# Patient Record
Sex: MALE | Race: WHITE | NOT HISPANIC OR LATINO | Employment: OTHER | URBAN - METROPOLITAN AREA
[De-identification: names, ages, dates, MRNs, and addresses within clinical notes are randomized per-mention and may not be internally consistent; named-entity substitution may affect disease eponyms.]

---

## 2018-02-15 ENCOUNTER — OFFICE VISIT (OUTPATIENT)
Dept: SURGERY | Facility: CLINIC | Age: 48
End: 2018-02-15
Payer: OTHER GOVERNMENT

## 2018-02-15 VITALS
WEIGHT: 199.75 LBS | HEART RATE: 79 BPM | SYSTOLIC BLOOD PRESSURE: 123 MMHG | DIASTOLIC BLOOD PRESSURE: 76 MMHG | HEIGHT: 68 IN | BODY MASS INDEX: 30.27 KG/M2

## 2018-02-15 DIAGNOSIS — Z85.048 HISTORY OF RECTAL OR ANAL CANCER: ICD-10-CM

## 2018-02-15 PROCEDURE — 99999 PR PBB SHADOW E&M-NEW PATIENT-LVL II: CPT | Mod: PBBFAC,,, | Performed by: COLON & RECTAL SURGERY

## 2018-02-15 PROCEDURE — 99202 OFFICE O/P NEW SF 15 MIN: CPT | Mod: PBBFAC,PO | Performed by: COLON & RECTAL SURGERY

## 2018-02-15 PROCEDURE — 99203 OFFICE O/P NEW LOW 30 MIN: CPT | Mod: S$PBB,,, | Performed by: COLON & RECTAL SURGERY

## 2018-02-15 NOTE — PROGRESS NOTES
Patient ID:  Bud Roberts is a 47 y.o. male     Chief Complaint:   Chief Complaint   Patient presents with    Follow-up        HPI:  7/30/12 LAR and coloanal pulthrough for T3N0 adenoa    s/p ileostomy closure on 9/10/12. C-scope Sept 2016 neg scans    Has urgency and accidents.    ROS:        Constitutional: No fever, chills, activity or appetite change.      HENT: No hearing loss, facial swelling, neck pain or stiffness.       Eyes: No discharge, itching and visual disturbance.      Respiratory: No apnea, cough, choking or shortness of breath.       Cardiovascular: No leg swelling or chest pain      Gastrointestinal: No abdominal distention or change in bowel habbits     Genitourinary: No dysuria, frequency or flank pain.      Musculoskeletal: No arthralgias or gait problem.      Neurological: No dizziness, seizures or weakness.      Hematological: No adenopathy.      Psychiatric/Behavioral: No hallucinations or behavioral problems.       PE:    APPEARANCE: Well nourished, well developed, in no acute distress.   CHEST: Lungs clear. Normal respiratory effort.  CARDIOVASCULAR: Normal rhythm. No edema.  ABDOMEN: Soft. No tenderness or masses.  Rectum:  Normal skin, NST, no masses or tenderness    Musculoskeletal: Symmetric, normal range of motion and strength.   Neurological: Alert and oriented to person, place, and time. Normal reflexes.   Skin: Skin is warm and dry.   Psychiatric: Normal mood and affect. Behavior is normal and appropriate.     Impression: s/p LAR     PLAN: Adjust Meds and fiber  RTC 1 months

## 2018-02-15 NOTE — PATIENT INSTRUCTIONS
Fiber on a daily basis 2 capsules in morning (Metamuci, citrucil or Gummy Fiber) by mouth in AM    Miralax  1/2 capful in AM    Imodium 2 mg daily in morning after 1st BM and 1 at night    Make 1 change  After 3-4 days.

## 2018-03-15 ENCOUNTER — OFFICE VISIT (OUTPATIENT)
Dept: SURGERY | Facility: CLINIC | Age: 48
End: 2018-03-15
Payer: OTHER GOVERNMENT

## 2018-03-15 VITALS
HEART RATE: 84 BPM | DIASTOLIC BLOOD PRESSURE: 72 MMHG | HEIGHT: 68 IN | BODY MASS INDEX: 30.78 KG/M2 | WEIGHT: 203.06 LBS | SYSTOLIC BLOOD PRESSURE: 121 MMHG | RESPIRATION RATE: 16 BRPM | TEMPERATURE: 98 F

## 2018-03-15 DIAGNOSIS — Z85.048 HISTORY OF RECTAL OR ANAL CANCER: Primary | ICD-10-CM

## 2018-03-15 DIAGNOSIS — R15.9 FULL INCONTINENCE OF FECES: ICD-10-CM

## 2018-03-15 PROCEDURE — 99213 OFFICE O/P EST LOW 20 MIN: CPT | Mod: PBBFAC,PO | Performed by: COLON & RECTAL SURGERY

## 2018-03-15 PROCEDURE — 99214 OFFICE O/P EST MOD 30 MIN: CPT | Mod: S$PBB,,, | Performed by: COLON & RECTAL SURGERY

## 2018-03-15 PROCEDURE — 99999 PR PBB SHADOW E&M-EST. PATIENT-LVL III: CPT | Mod: PBBFAC,,, | Performed by: COLON & RECTAL SURGERY

## 2018-03-15 RX ORDER — LISINOPRIL 10 MG/1
10 TABLET ORAL DAILY
COMMUNITY

## 2018-03-15 RX ORDER — HYDROCODONE BITARTRATE AND ACETAMINOPHEN 10; 325 MG/1; MG/1
41 TABLET ORAL EVERY 6 HOURS PRN
Status: ON HOLD | COMMUNITY
End: 2018-04-20 | Stop reason: HOSPADM

## 2018-03-15 NOTE — PROGRESS NOTES
Patient ID:  Bud Roberts Jr. is a 47 y.o. male     Chief Complaint:   Chief Complaint   Patient presents with    Follow-up     incontinence        HPI: No improvement with Imodium    7/30/12 LAR and coloanal pulthrough for T3N0 adenoa    s/p ileostomy closure on 9/10/12. C-scope Sept 2016 neg scans    Has urgency and accidents.    ROS:        Constitutional: No fever, chills, activity or appetite change.      HENT: No hearing loss, facial swelling, neck pain or stiffness.       Eyes: No discharge, itching and visual disturbance.      Respiratory: No apnea, cough, choking or shortness of breath.       Cardiovascular: No leg swelling or chest pain      Gastrointestinal: No abdominal distention or change in bowel habbits     Genitourinary: No dysuria, frequency or flank pain.      Musculoskeletal: No arthralgias or gait problem.      Neurological: No dizziness, seizures or weakness.      Hematological: No adenopathy.      Psychiatric/Behavioral: No hallucinations or behavioral problems.       PE:    APPEARANCE: Well nourished, well developed, in no acute distress.   CHEST: Lungs clear. Normal respiratory effort.  CARDIOVASCULAR: Normal rhythm. No edema.  ABDOMEN: Soft. No tenderness or masses.  Rectum:  Normal skin, NST, no masses or tenderness    Musculoskeletal: Symmetric, normal range of motion and strength.   Neurological: Alert and oriented to person, place, and time. Normal reflexes.   Skin: Skin is warm and dry.   Psychiatric: Normal mood and affect. Behavior is normal and appropriate.     Impression: s/p LAR, LAR syndrome   PLAN: DIscussed optionsd and how to adjust meds. Patient will call to schedule colostomy.

## 2018-03-15 NOTE — PROGRESS NOTES
Patient ID:  Bud Roberts Jr. is a 47 y.o. male     Chief Complaint:   Chief Complaint   Patient presents with    Follow-up     incontinence        HPI:  7/30/12 LAR and coloanal pulthrough for T3N0 adenoa    s/p ileostomy closure on 9/10/12. C-scope Sept 2016 neg scans    Has urgency and accidents.    ROS:        Constitutional: No fever, chills, activity or appetite change.      HENT: No hearing loss, facial swelling, neck pain or stiffness.       Eyes: No discharge, itching and visual disturbance.      Respiratory: No apnea, cough, choking or shortness of breath.       Cardiovascular: No leg swelling or chest pain      Gastrointestinal: No abdominal distention or change in bowel habbits     Genitourinary: No dysuria, frequency or flank pain.      Musculoskeletal: No arthralgias or gait problem.      Neurological: No dizziness, seizures or weakness.      Hematological: No adenopathy.      Psychiatric/Behavioral: No hallucinations or behavioral problems.       PE:    APPEARANCE: Well nourished, well developed, in no acute distress.   CHEST: Lungs clear. Normal respiratory effort.  CARDIOVASCULAR: Normal rhythm. No edema.  ABDOMEN: Soft. No tenderness or masses.  Rectum:  Normal skin, NST, no masses or tenderness    Musculoskeletal: Symmetric, normal range of motion and strength.   Neurological: Alert and oriented to person, place, and time. Normal reflexes.   Skin: Skin is warm and dry.   Psychiatric: Normal mood and affect. Behavior is normal and appropriate.     Impression: s/p LAR     PLAN: Adjust Meds and fiber  RTC 1 months

## 2018-03-16 ENCOUNTER — TELEPHONE (OUTPATIENT)
Dept: SURGERY | Facility: CLINIC | Age: 48
End: 2018-03-16

## 2018-03-16 NOTE — TELEPHONE ENCOUNTER
----- Message from Drea Major MA sent at 3/16/2018 10:46 AM CDT -----  Contact: self  [phone]  States he is calling to scheduled his surgery.

## 2018-03-16 NOTE — TELEPHONE ENCOUNTER
Pt wanted to schedule surgery for colostomy creation. Pt wanted to schedule for 4/16/18. Told him he will get a call back Monday to let him know if he needs to come in for paperwork.

## 2018-03-29 ENCOUNTER — TELEPHONE (OUTPATIENT)
Dept: SURGERY | Facility: CLINIC | Age: 48
End: 2018-03-29

## 2018-03-29 DIAGNOSIS — Z85.048 HISTORY OF RECTAL OR ANAL CANCER: ICD-10-CM

## 2018-03-29 DIAGNOSIS — R15.9 INCONTINENCE OF FECES, UNSPECIFIED FECAL INCONTINENCE TYPE: Primary | ICD-10-CM

## 2018-03-29 DIAGNOSIS — R15.9 INCONTINENCE OF FECES: ICD-10-CM

## 2018-03-29 RX ORDER — METRONIDAZOLE 500 MG/1
TABLET ORAL
Qty: 3 TABLET | Refills: 0 | Status: ON HOLD | OUTPATIENT
Start: 2018-03-29 | End: 2018-04-20 | Stop reason: HOSPADM

## 2018-03-29 RX ORDER — MUPIROCIN 20 MG/G
OINTMENT TOPICAL
Status: CANCELLED | OUTPATIENT
Start: 2018-03-29

## 2018-03-29 RX ORDER — SODIUM CHLORIDE 9 MG/ML
INJECTION, SOLUTION INTRAVENOUS CONTINUOUS
Status: CANCELLED | OUTPATIENT
Start: 2018-03-29

## 2018-03-29 RX ORDER — NEOMYCIN SULFATE 500 MG/1
1000 TABLET ORAL ONCE
Qty: 6 TABLET | Refills: 0 | Status: SHIPPED | OUTPATIENT
Start: 2018-03-29 | End: 2018-03-29

## 2018-03-29 RX ORDER — ACETAMINOPHEN 10 MG/ML
1000 INJECTION, SOLUTION INTRAVENOUS
Status: CANCELLED | OUTPATIENT
Start: 2018-03-29 | End: 2018-03-29

## 2018-03-29 RX ORDER — METRONIDAZOLE 500 MG/100ML
500 INJECTION, SOLUTION INTRAVENOUS
Status: CANCELLED | OUTPATIENT
Start: 2018-03-29

## 2018-03-29 NOTE — TELEPHONE ENCOUNTER
----- Message from Laquita Zamorano MA sent at 3/29/2018 10:42 AM CDT -----  Contact: [phone]  Pt states that he is still needing to speak to someone about getting set up for surgery on April 16th, he said that it's been a week or so since he spoke to anyone.    [phone]

## 2018-03-29 NOTE — TELEPHONE ENCOUNTER
Spoke with patient regarding his surgery on 4/16 for a colostomy.  Dr. Greenberg will consent him the morning of surgery.  I went over his instructions and emailed them to him. He understands.

## 2018-04-03 ENCOUNTER — TELEPHONE (OUTPATIENT)
Dept: SURGERY | Facility: CLINIC | Age: 48
End: 2018-04-03

## 2018-04-03 NOTE — TELEPHONE ENCOUNTER
Spoke with pt and reviewed pre op instructions and emailed a copy to him. Also provided pt with fax number to disability department to submit [facility] paperwork.

## 2018-04-03 NOTE — TELEPHONE ENCOUNTER
----- Message from Kimberly Monsalve sent at 4/3/2018  2:21 PM CDT -----  Contact: pt [phone]  Pt states he would like to speak with Eulalia again regarding more questions he has about his surgery on 04/16/18.

## 2018-04-03 NOTE — TELEPHONE ENCOUNTER
----- Message from Amber العراقي sent at 4/3/2018  8:44 AM CDT -----  Contact: self - [phone]  Dionicio - has questions re his surgery - please call patient at

## 2018-04-13 ENCOUNTER — TELEPHONE (OUTPATIENT)
Dept: SURGERY | Facility: CLINIC | Age: 48
End: 2018-04-13

## 2018-04-13 NOTE — TELEPHONE ENCOUNTER
----- Message from Eulalia Christina RN sent at 4/12/2018  5:16 PM CDT -----  Contact: Karen margie Lynch#[phone]  Can you let her know I do not have the form.   ThanksEulalia  ----- Message -----  From: Rachael Garcia  Sent: 4/12/2018   3:14 PM  To: Dionicio Paredes    She wants to know if you received the physician form. It was faxed out on 4/10

## 2018-04-13 NOTE — TELEPHONE ENCOUNTER
Spoke with Clifton at [facility] regarding a fax that was sent from them.  He verified the fax number that it was sent to which was incorrect.  I gave him the correct fax number.

## 2018-04-13 NOTE — TELEPHONE ENCOUNTER
----- Message from Rachael Garcia sent at 4/12/2018  3:14 PM CDT -----  Contact: Karen Lynch#[phone]  She wants to know if you received the physician form. It was faxed out on 4/10

## 2018-04-16 ENCOUNTER — HOSPITAL ENCOUNTER (INPATIENT)
Facility: HOSPITAL | Age: 48
LOS: 4 days | Discharge: HOME | DRG: 331 | End: 2018-04-20
Attending: COLON & RECTAL SURGERY | Admitting: COLON & RECTAL SURGERY
Payer: OTHER GOVERNMENT

## 2018-04-16 ENCOUNTER — ANESTHESIA EVENT (OUTPATIENT)
Dept: SURGERY | Facility: HOSPITAL | Age: 48
DRG: 331 | End: 2018-04-16
Payer: OTHER GOVERNMENT

## 2018-04-16 ENCOUNTER — SURGERY (OUTPATIENT)
Age: 48
End: 2018-04-16

## 2018-04-16 ENCOUNTER — ANESTHESIA (OUTPATIENT)
Dept: SURGERY | Facility: HOSPITAL | Age: 48
DRG: 331 | End: 2018-04-16
Payer: OTHER GOVERNMENT

## 2018-04-16 DIAGNOSIS — Z85.048 HISTORY OF RECTAL OR ANAL CANCER: ICD-10-CM

## 2018-04-16 DIAGNOSIS — R15.9 INCONTINENCE OF FECES, UNSPECIFIED FECAL INCONTINENCE TYPE: ICD-10-CM

## 2018-04-16 DIAGNOSIS — R15.9 INCONTINENCE OF FECES: ICD-10-CM

## 2018-04-16 PROCEDURE — 88307 TISSUE EXAM BY PATHOLOGIST: CPT | Mod: 26,,, | Performed by: PATHOLOGY

## 2018-04-16 PROCEDURE — 25000003 PHARM REV CODE 250: Performed by: STUDENT IN AN ORGANIZED HEALTH CARE EDUCATION/TRAINING PROGRAM

## 2018-04-16 PROCEDURE — D9220A PRA ANESTHESIA: Mod: CRNA,,, | Performed by: NURSE ANESTHETIST, CERTIFIED REGISTERED

## 2018-04-16 PROCEDURE — 20600001 HC STEP DOWN PRIVATE ROOM

## 2018-04-16 PROCEDURE — 63600175 PHARM REV CODE 636 W HCPCS: Performed by: ANESTHESIOLOGY

## 2018-04-16 PROCEDURE — 25000003 PHARM REV CODE 250: Performed by: NURSE PRACTITIONER

## 2018-04-16 PROCEDURE — 25000003 PHARM REV CODE 250: Performed by: NURSE ANESTHETIST, CERTIFIED REGISTERED

## 2018-04-16 PROCEDURE — C9290 INJ, BUPIVACAINE LIPOSOME: HCPCS | Performed by: COLON & RECTAL SURGERY

## 2018-04-16 PROCEDURE — 25000003 PHARM REV CODE 250: Performed by: COLON & RECTAL SURGERY

## 2018-04-16 PROCEDURE — 63600175 PHARM REV CODE 636 W HCPCS: Performed by: NURSE ANESTHETIST, CERTIFIED REGISTERED

## 2018-04-16 PROCEDURE — 36000708 HC OR TIME LEV III 1ST 15 MIN: Performed by: COLON & RECTAL SURGERY

## 2018-04-16 PROCEDURE — 71000039 HC RECOVERY, EACH ADD'L HOUR: Performed by: COLON & RECTAL SURGERY

## 2018-04-16 PROCEDURE — D9220A PRA ANESTHESIA: Mod: ANES,,, | Performed by: ANESTHESIOLOGY

## 2018-04-16 PROCEDURE — 63600175 PHARM REV CODE 636 W HCPCS: Performed by: COLON & RECTAL SURGERY

## 2018-04-16 PROCEDURE — 36000709 HC OR TIME LEV III EA ADD 15 MIN: Performed by: COLON & RECTAL SURGERY

## 2018-04-16 PROCEDURE — 27201423 OPTIME MED/SURG SUP & DEVICES STERILE SUPPLY: Performed by: COLON & RECTAL SURGERY

## 2018-04-16 PROCEDURE — 0D1M0Z4 BYPASS DESCENDING COLON TO CUTANEOUS, OPEN APPROACH: ICD-10-PCS | Performed by: COLON & RECTAL SURGERY

## 2018-04-16 PROCEDURE — 25000003 PHARM REV CODE 250: Performed by: SURGERY

## 2018-04-16 PROCEDURE — C1729 CATH, DRAINAGE: HCPCS | Performed by: COLON & RECTAL SURGERY

## 2018-04-16 PROCEDURE — 0WQF0ZZ REPAIR ABDOMINAL WALL, OPEN APPROACH: ICD-10-PCS | Performed by: COLON & RECTAL SURGERY

## 2018-04-16 PROCEDURE — 99900035 HC TECH TIME PER 15 MIN (STAT)

## 2018-04-16 PROCEDURE — 63600175 PHARM REV CODE 636 W HCPCS: Performed by: SURGERY

## 2018-04-16 PROCEDURE — 63600175 PHARM REV CODE 636 W HCPCS

## 2018-04-16 PROCEDURE — 0DBQ0ZZ EXCISION OF ANUS, OPEN APPROACH: ICD-10-PCS | Performed by: COLON & RECTAL SURGERY

## 2018-04-16 PROCEDURE — 71000033 HC RECOVERY, INTIAL HOUR: Performed by: COLON & RECTAL SURGERY

## 2018-04-16 PROCEDURE — 37000009 HC ANESTHESIA EA ADD 15 MINS: Performed by: COLON & RECTAL SURGERY

## 2018-04-16 PROCEDURE — 44146 PARTIAL REMOVAL OF COLON: CPT | Mod: ,,, | Performed by: COLON & RECTAL SURGERY

## 2018-04-16 PROCEDURE — S0030 INJECTION, METRONIDAZOLE: HCPCS | Performed by: COLON & RECTAL SURGERY

## 2018-04-16 PROCEDURE — 37000008 HC ANESTHESIA 1ST 15 MINUTES: Performed by: COLON & RECTAL SURGERY

## 2018-04-16 PROCEDURE — 0DBM0ZZ EXCISION OF DESCENDING COLON, OPEN APPROACH: ICD-10-PCS | Performed by: COLON & RECTAL SURGERY

## 2018-04-16 PROCEDURE — 88307 TISSUE EXAM BY PATHOLOGIST: CPT | Performed by: PATHOLOGY

## 2018-04-16 RX ORDER — MIDAZOLAM HYDROCHLORIDE 1 MG/ML
INJECTION, SOLUTION INTRAMUSCULAR; INTRAVENOUS
Status: DISCONTINUED | OUTPATIENT
Start: 2018-04-16 | End: 2018-04-16

## 2018-04-16 RX ORDER — METRONIDAZOLE 500 MG/100ML
500 INJECTION, SOLUTION INTRAVENOUS
Status: COMPLETED | OUTPATIENT
Start: 2018-04-16 | End: 2018-04-16

## 2018-04-16 RX ORDER — MUPIROCIN 20 MG/G
OINTMENT TOPICAL
Status: DISCONTINUED | OUTPATIENT
Start: 2018-04-16 | End: 2018-04-16

## 2018-04-16 RX ORDER — DIPHENHYDRAMINE HYDROCHLORIDE 50 MG/ML
12.5 INJECTION INTRAMUSCULAR; INTRAVENOUS EVERY 4 HOURS PRN
Status: DISCONTINUED | OUTPATIENT
Start: 2018-04-16 | End: 2018-04-20 | Stop reason: HOSPADM

## 2018-04-16 RX ORDER — SODIUM CHLORIDE, SODIUM LACTATE, POTASSIUM CHLORIDE, CALCIUM CHLORIDE 600; 310; 30; 20 MG/100ML; MG/100ML; MG/100ML; MG/100ML
INJECTION, SOLUTION INTRAVENOUS CONTINUOUS
Status: DISCONTINUED | OUTPATIENT
Start: 2018-04-16 | End: 2018-04-20

## 2018-04-16 RX ORDER — ACETAMINOPHEN 10 MG/ML
1000 INJECTION, SOLUTION INTRAVENOUS EVERY 8 HOURS
Status: COMPLETED | OUTPATIENT
Start: 2018-04-16 | End: 2018-04-17

## 2018-04-16 RX ORDER — HYDROMORPHONE HYDROCHLORIDE 1 MG/ML
INJECTION, SOLUTION INTRAMUSCULAR; INTRAVENOUS; SUBCUTANEOUS
Status: COMPLETED
Start: 2018-04-16 | End: 2018-04-16

## 2018-04-16 RX ORDER — NALOXONE HCL 0.4 MG/ML
0.02 VIAL (ML) INJECTION
Status: DISCONTINUED | OUTPATIENT
Start: 2018-04-16 | End: 2018-04-20 | Stop reason: HOSPADM

## 2018-04-16 RX ORDER — SODIUM CHLORIDE 0.9 % (FLUSH) 0.9 %
3 SYRINGE (ML) INJECTION
Status: DISCONTINUED | OUTPATIENT
Start: 2018-04-16 | End: 2018-04-16 | Stop reason: HOSPADM

## 2018-04-16 RX ORDER — DEXAMETHASONE SODIUM PHOSPHATE 4 MG/ML
INJECTION, SOLUTION INTRA-ARTICULAR; INTRALESIONAL; INTRAMUSCULAR; INTRAVENOUS; SOFT TISSUE
Status: DISCONTINUED | OUTPATIENT
Start: 2018-04-16 | End: 2018-04-16

## 2018-04-16 RX ORDER — GLYCOPYRROLATE 0.2 MG/ML
INJECTION INTRAMUSCULAR; INTRAVENOUS
Status: DISCONTINUED | OUTPATIENT
Start: 2018-04-16 | End: 2018-04-16

## 2018-04-16 RX ORDER — MORPHINE SULFATE 2 MG/ML
3 INJECTION, SOLUTION INTRAMUSCULAR; INTRAVENOUS
Status: DISCONTINUED | OUTPATIENT
Start: 2018-04-16 | End: 2018-04-20 | Stop reason: HOSPADM

## 2018-04-16 RX ORDER — MUPIROCIN 20 MG/G
1 OINTMENT TOPICAL 2 TIMES DAILY
Status: DISCONTINUED | OUTPATIENT
Start: 2018-04-16 | End: 2018-04-20 | Stop reason: HOSPADM

## 2018-04-16 RX ORDER — RAMELTEON 8 MG/1
8 TABLET ORAL NIGHTLY PRN
Status: DISCONTINUED | OUTPATIENT
Start: 2018-04-16 | End: 2018-04-20 | Stop reason: HOSPADM

## 2018-04-16 RX ORDER — BUPIVACAINE HYDROCHLORIDE 2.5 MG/ML
INJECTION, SOLUTION EPIDURAL; INFILTRATION; INTRACAUDAL
Status: DISCONTINUED | OUTPATIENT
Start: 2018-04-16 | End: 2018-04-16 | Stop reason: HOSPADM

## 2018-04-16 RX ORDER — NEOSTIGMINE METHYLSULFATE 1 MG/ML
INJECTION, SOLUTION INTRAVENOUS
Status: DISCONTINUED | OUTPATIENT
Start: 2018-04-16 | End: 2018-04-16

## 2018-04-16 RX ORDER — LIDOCAINE HCL/PF 100 MG/5ML
SYRINGE (ML) INTRAVENOUS
Status: DISCONTINUED | OUTPATIENT
Start: 2018-04-16 | End: 2018-04-16

## 2018-04-16 RX ORDER — PANTOPRAZOLE SODIUM 40 MG/1
40 TABLET, DELAYED RELEASE ORAL DAILY
Status: DISCONTINUED | OUTPATIENT
Start: 2018-04-16 | End: 2018-04-20 | Stop reason: HOSPADM

## 2018-04-16 RX ORDER — PROPOFOL 10 MG/ML
VIAL (ML) INTRAVENOUS
Status: DISCONTINUED | OUTPATIENT
Start: 2018-04-16 | End: 2018-04-16

## 2018-04-16 RX ORDER — FENTANYL CITRATE 50 UG/ML
25 INJECTION, SOLUTION INTRAMUSCULAR; INTRAVENOUS EVERY 5 MIN PRN
Status: DISCONTINUED | OUTPATIENT
Start: 2018-04-16 | End: 2018-04-16

## 2018-04-16 RX ORDER — OXYCODONE HYDROCHLORIDE 5 MG/1
5 TABLET ORAL EVERY 4 HOURS PRN
Status: DISCONTINUED | OUTPATIENT
Start: 2018-04-16 | End: 2018-04-16

## 2018-04-16 RX ORDER — ENOXAPARIN SODIUM 100 MG/ML
40 INJECTION SUBCUTANEOUS EVERY 24 HOURS
Status: DISCONTINUED | OUTPATIENT
Start: 2018-04-17 | End: 2018-04-20 | Stop reason: HOSPADM

## 2018-04-16 RX ORDER — ONDANSETRON 2 MG/ML
4 INJECTION INTRAMUSCULAR; INTRAVENOUS EVERY 12 HOURS PRN
Status: DISCONTINUED | OUTPATIENT
Start: 2018-04-16 | End: 2018-04-20 | Stop reason: HOSPADM

## 2018-04-16 RX ORDER — METOCLOPRAMIDE HYDROCHLORIDE 5 MG/ML
5 INJECTION INTRAMUSCULAR; INTRAVENOUS EVERY 6 HOURS PRN
Status: DISCONTINUED | OUTPATIENT
Start: 2018-04-16 | End: 2018-04-20 | Stop reason: HOSPADM

## 2018-04-16 RX ORDER — KETAMINE HYDROCHLORIDE 100 MG/ML
INJECTION, SOLUTION INTRAMUSCULAR; INTRAVENOUS
Status: DISCONTINUED | OUTPATIENT
Start: 2018-04-16 | End: 2018-04-16

## 2018-04-16 RX ORDER — FENTANYL CITRATE 50 UG/ML
25 INJECTION, SOLUTION INTRAMUSCULAR; INTRAVENOUS EVERY 5 MIN PRN
Status: COMPLETED | OUTPATIENT
Start: 2018-04-16 | End: 2018-04-16

## 2018-04-16 RX ORDER — CALCIUM CARBONATE 200(500)MG
1000 TABLET,CHEWABLE ORAL EVERY 8 HOURS PRN
Status: DISCONTINUED | OUTPATIENT
Start: 2018-04-16 | End: 2018-04-20 | Stop reason: HOSPADM

## 2018-04-16 RX ORDER — METOCLOPRAMIDE HYDROCHLORIDE 5 MG/ML
10 INJECTION INTRAMUSCULAR; INTRAVENOUS EVERY 10 MIN PRN
Status: DISCONTINUED | OUTPATIENT
Start: 2018-04-16 | End: 2018-04-16

## 2018-04-16 RX ORDER — ACETAMINOPHEN 10 MG/ML
1000 INJECTION, SOLUTION INTRAVENOUS
Status: COMPLETED | OUTPATIENT
Start: 2018-04-16 | End: 2018-04-16

## 2018-04-16 RX ORDER — FENTANYL CITRATE 50 UG/ML
INJECTION, SOLUTION INTRAMUSCULAR; INTRAVENOUS
Status: COMPLETED
Start: 2018-04-16 | End: 2018-04-16

## 2018-04-16 RX ORDER — SODIUM CHLORIDE 9 MG/ML
INJECTION, SOLUTION INTRAVENOUS CONTINUOUS
Status: DISCONTINUED | OUTPATIENT
Start: 2018-04-16 | End: 2018-04-16

## 2018-04-16 RX ORDER — ONDANSETRON 2 MG/ML
INJECTION INTRAMUSCULAR; INTRAVENOUS
Status: DISCONTINUED | OUTPATIENT
Start: 2018-04-16 | End: 2018-04-16

## 2018-04-16 RX ORDER — FENTANYL CITRATE 50 UG/ML
INJECTION, SOLUTION INTRAMUSCULAR; INTRAVENOUS
Status: DISCONTINUED | OUTPATIENT
Start: 2018-04-16 | End: 2018-04-16

## 2018-04-16 RX ORDER — HYDROMORPHONE HYDROCHLORIDE 1 MG/ML
0.2 INJECTION, SOLUTION INTRAMUSCULAR; INTRAVENOUS; SUBCUTANEOUS EVERY 5 MIN PRN
Status: DISCONTINUED | OUTPATIENT
Start: 2018-04-16 | End: 2018-04-16 | Stop reason: HOSPADM

## 2018-04-16 RX ORDER — ROCURONIUM BROMIDE 10 MG/ML
INJECTION, SOLUTION INTRAVENOUS
Status: DISCONTINUED | OUTPATIENT
Start: 2018-04-16 | End: 2018-04-16

## 2018-04-16 RX ORDER — OXYCODONE HYDROCHLORIDE 5 MG/1
10 TABLET ORAL EVERY 4 HOURS PRN
Status: DISCONTINUED | OUTPATIENT
Start: 2018-04-16 | End: 2018-04-20 | Stop reason: HOSPADM

## 2018-04-16 RX ORDER — METOCLOPRAMIDE HYDROCHLORIDE 5 MG/ML
10 INJECTION INTRAMUSCULAR; INTRAVENOUS EVERY 10 MIN PRN
Status: DISCONTINUED | OUTPATIENT
Start: 2018-04-16 | End: 2018-04-16 | Stop reason: HOSPADM

## 2018-04-16 RX ADMIN — PROPOFOL 200 MG: 10 INJECTION, EMULSION INTRAVENOUS at 07:04

## 2018-04-16 RX ADMIN — BUPIVACAINE 20 ML: 13.3 INJECTION, SUSPENSION, LIPOSOMAL INFILTRATION at 07:04

## 2018-04-16 RX ADMIN — Medication 0.2 MG: at 10:04

## 2018-04-16 RX ADMIN — PANTOPRAZOLE SODIUM 40 MG: 40 TABLET, DELAYED RELEASE ORAL at 11:04

## 2018-04-16 RX ADMIN — NEOSTIGMINE METHYLSULFATE 4 MG: 1 INJECTION INTRAVENOUS at 09:04

## 2018-04-16 RX ADMIN — SODIUM CHLORIDE 1 DROP: 20 SOLUTION OPHTHALMIC at 01:04

## 2018-04-16 RX ADMIN — MORPHINE SULFATE 3 MG: 2 INJECTION, SOLUTION INTRAMUSCULAR; INTRAVENOUS at 11:04

## 2018-04-16 RX ADMIN — FENTANYL CITRATE 25 MCG: 50 INJECTION INTRAMUSCULAR; INTRAVENOUS at 09:04

## 2018-04-16 RX ADMIN — METRONIDAZOLE 500 MG: 500 SOLUTION INTRAVENOUS at 07:04

## 2018-04-16 RX ADMIN — IBUPROFEN 400 MG: 800 INJECTION INTRAVENOUS at 11:04

## 2018-04-16 RX ADMIN — OXYCODONE HYDROCHLORIDE 10 MG: 5 TABLET ORAL at 10:04

## 2018-04-16 RX ADMIN — ONDANSETRON 4 MG: 2 INJECTION INTRAMUSCULAR; INTRAVENOUS at 09:04

## 2018-04-16 RX ADMIN — SODIUM CHLORIDE, SODIUM GLUCONATE, SODIUM ACETATE, POTASSIUM CHLORIDE, MAGNESIUM CHLORIDE, SODIUM PHOSPHATE, DIBASIC, AND POTASSIUM PHOSPHATE: .53; .5; .37; .037; .03; .012; .00082 INJECTION, SOLUTION INTRAVENOUS at 08:04

## 2018-04-16 RX ADMIN — OXYCODONE HYDROCHLORIDE 5 MG: 5 TABLET ORAL at 09:04

## 2018-04-16 RX ADMIN — ROCURONIUM BROMIDE 40 MG: 10 INJECTION, SOLUTION INTRAVENOUS at 07:04

## 2018-04-16 RX ADMIN — ROCURONIUM BROMIDE 10 MG: 10 INJECTION, SOLUTION INTRAVENOUS at 08:04

## 2018-04-16 RX ADMIN — FENTANYL CITRATE 50 MCG: 50 INJECTION, SOLUTION INTRAMUSCULAR; INTRAVENOUS at 07:04

## 2018-04-16 RX ADMIN — MORPHINE SULFATE 3 MG: 2 INJECTION, SOLUTION INTRAMUSCULAR; INTRAVENOUS at 04:04

## 2018-04-16 RX ADMIN — ROCURONIUM BROMIDE 10 MG: 10 INJECTION, SOLUTION INTRAVENOUS at 07:04

## 2018-04-16 RX ADMIN — DEXAMETHASONE SODIUM PHOSPHATE 8 MG: 4 INJECTION, SOLUTION INTRAMUSCULAR; INTRAVENOUS at 07:04

## 2018-04-16 RX ADMIN — IBUPROFEN 800 MG: 800 INJECTION INTRAVENOUS at 06:04

## 2018-04-16 RX ADMIN — ACETAMINOPHEN 1000 MG: 10 INJECTION, SOLUTION INTRAVENOUS at 05:04

## 2018-04-16 RX ADMIN — IBUPROFEN 400 MG: 800 INJECTION INTRAVENOUS at 03:04

## 2018-04-16 RX ADMIN — CEFTRIAXONE 2 G: 2 INJECTION, SOLUTION INTRAVENOUS at 07:04

## 2018-04-16 RX ADMIN — BUPIVACAINE HYDROCHLORIDE 30 ML: 2.5 INJECTION, SOLUTION EPIDURAL; INFILTRATION; INTRACAUDAL; PERINEURAL at 07:04

## 2018-04-16 RX ADMIN — MIDAZOLAM HYDROCHLORIDE 2 MG: 1 INJECTION, SOLUTION INTRAMUSCULAR; INTRAVENOUS at 07:04

## 2018-04-16 RX ADMIN — MUPIROCIN 1 G: 20 OINTMENT TOPICAL at 10:04

## 2018-04-16 RX ADMIN — SODIUM CHLORIDE: 0.9 INJECTION, SOLUTION INTRAVENOUS at 05:04

## 2018-04-16 RX ADMIN — OXYCODONE HYDROCHLORIDE 5 MG: 5 TABLET ORAL at 05:04

## 2018-04-16 RX ADMIN — OXYCODONE HYDROCHLORIDE 5 MG: 5 TABLET ORAL at 01:04

## 2018-04-16 RX ADMIN — SODIUM CHLORIDE, SODIUM LACTATE, POTASSIUM CHLORIDE, AND CALCIUM CHLORIDE: .6; .31; .03; .02 INJECTION, SOLUTION INTRAVENOUS at 09:04

## 2018-04-16 RX ADMIN — ACETAMINOPHEN 1000 MG: 10 INJECTION, SOLUTION INTRAVENOUS at 01:04

## 2018-04-16 RX ADMIN — ACETAMINOPHEN 1000 MG: 10 INJECTION, SOLUTION INTRAVENOUS at 10:04

## 2018-04-16 RX ADMIN — KETAMINE HYDROCHLORIDE 25 MG: 100 INJECTION, SOLUTION, CONCENTRATE INTRAMUSCULAR; INTRAVENOUS at 07:04

## 2018-04-16 RX ADMIN — SODIUM CHLORIDE, SODIUM GLUCONATE, SODIUM ACETATE, POTASSIUM CHLORIDE, MAGNESIUM CHLORIDE, SODIUM PHOSPHATE, DIBASIC, AND POTASSIUM PHOSPHATE: .53; .5; .37; .037; .03; .012; .00082 INJECTION, SOLUTION INTRAVENOUS at 07:04

## 2018-04-16 RX ADMIN — MORPHINE SULFATE 3 MG: 2 INJECTION, SOLUTION INTRAMUSCULAR; INTRAVENOUS at 07:04

## 2018-04-16 RX ADMIN — LIDOCAINE HYDROCHLORIDE 100 MG: 20 INJECTION, SOLUTION INTRAVENOUS at 07:04

## 2018-04-16 RX ADMIN — GLYCOPYRROLATE 0.4 MG: 0.2 INJECTION, SOLUTION INTRAMUSCULAR; INTRAVENOUS at 09:04

## 2018-04-16 RX ADMIN — MUPIROCIN: 20 OINTMENT TOPICAL at 05:04

## 2018-04-16 NOTE — OP NOTE
DATE OF PROCEDURE:  04/16/2018    PROCEDURES PERFORMED:  Low anterior resection and creation of end colostomy with   excision of previous coloanal anastomosis.    ATTENDING SURGEON:  Martin Greenberg M.D.    ASSISTANT:  Alicia Peña M.D.    PREOPERATIVE DIAGNOSES:  1.  History of rectal cancer, status post previous low anterior resection with   handsewn coloanal anastomosis.  2.  Fecal incontinence.  3.  Low anterior resection syndrome.    POSTOPERATIVE DIAGNOSES:  1.  History of rectal cancer, status post previous low anterior resection with   handsewn coloanal anastomosis.  2.  Fecal incontinence.  3.  Low anterior resection syndrome.    ANESTHESIA:  General endotracheal anesthesia and Exparel 266 mg, Marcaine 0.25%   and 10 mL of saline in preperitoneal injection.    ESTIMATED BLOOD LOSS:  150 mL.    SPECIMENS:  Previous coloanal anastomosis and part of descending colon.    COMPLICATIONS:  None apparent.    DISPOSITION:  Extubated to PACU, then millan.    INDICATIONS FOR THE PROCEDURE:  Bud Roberts is a 48-year-old male patient with   history of a T3 N0 rectal adenocarcinoma who had undergone chemoradiation and   subsequent low anterior resection with a coloanal pull-through in 2012, and had   undergone ileostomy closure 2 months after that.   Since surgery he had    urgency and fecal incontinence and this persisted despite maximal   medical optimization.  Therefore, a discussion was held with the patient   regarding conversion to a permanent colostomy and the patient wished to proceed.    Risks, benefits and alternatives were discussed with the patient and he   elected to proceed with the operation.      FINDINGS:   The colon appeared healthy.  The previous coloanal anastomosis was removed and the   sphincters were reapproximated.  A colostomy was made in the left lower quadrant   and this appeared well vascularized.    DESCRIPTION OF THE PROCEDURE IN DETAIL:  The patient was appropriately   identified in the  preoperative holding area and brought to the Operating Room   where general endotracheal anesthesia was administered.  He received   preoperative antibiotics as well as heparin.  The patient was then placed in the   low lithotomy position.  A Steve catheter was placed.  A distal rectal washout   was performed with Betadine and the patient's abdomen was then prepped and   draped in the usual sterile fashion.  A preoperative ostomy site marking had   been made while the patient was in the holding area.  A preoperative surgical   safety timeout was then performed and we made an incision taking care to excise   his previous scar in the lower midline.  This was then extended to the fascia   with electrocautery and then the fascia was entered sharply.  There were minimal   adhesions to the anterior abdominal wall.  There was some omentum that was   stuck to the anterior abdominal wall and this was taken down sharply with some   scissors as well as with electrocautery.  There was noted to be a small hernia   in the previous ileostomy takedown site.  All of the small intestine appeared   healthy.  The patient was then placed in reverse Trendelenburg and the small   bowel was then packed out of the pelvis and a Taylor retractor and wound   protector was placed.  We then examined the descending colon, which appeared   healthy all the way down to the anastomosis.  The mesentery of the descending   colon was then dissected free from the retroperitoneal structures taking care to   identify and protect the left ureter.  This was then done in circumferential   fashion extending down to the level of the pelvic floor and previous   anastomosis.  We made sure to have enough length proximally as well by   dissecting off the mesentery of the lateral sidewall and up to the level of the   flexure.  Once the colon had good mobility and we had reached all the way down   to the pelvic floor in our pelvic dissection, we then proceeded with  the   dissection from below.      Using the Kamilla Eaton retractor from below, we incised   just at the level of the dentate line, which was also just at the level of the   previous coloanal anastomosis.  The mucosa was incised using electrocautery   circumferentially and then we proceeded dissection in this plane using   Metzenbaum scissors.  Dr. Greenberg then went from above and the abdomen was entered   in the posterior plane.  The dissection was carried around circumferentially   until the entire coloanal anastomosis was free.  This was then disconnected   fully and hemostasis was achieved from below.  We closed the sphincters by   approximating with 2-0 Vicryl in U-stitch fashion.  This was closed in 3 layers   with a deeper layer, a more superficial layer and then the skin was run with 3-0   Vicryl in simple fashion.      We then changed gloves and proceeded with the rest   of the abdominal part of the procedure.  The colon was examined and there did   appear to be some compromise of the blood supply at the level of the previous  anastomosis, so we decided to take this back after making our colostomy   incision.  A disk of skin was then removed at the marking of the colostomy site   after grasping the fascia from the midline.  This was then dissected through the   subcutaneous tissues until the anterior rectus sheath was then incised   vertically.  The rectus muscles spread and the posterior rectus sheath was also   entered.  This accommodated at least 2 fingerbreadths using a Alexa.  The   colon was then placed through the colostomy site.  We did resect approximately   10 cm of the distal descending colon to the level where it had good blood   supply.  The mesentery was dissected using 0 chromic ties.  The colon appeared   healthy and the specimen was then sent off.  The colostomy was then matured   using interrupted 3-0 chromic suture and brooking in the North, East, South and   West quadrants.  Before  closing the fascia, we examined the pelvis.  There was a   small amount of ooze from previous descending colon mesentery that was stuck to   the sacrum.  This was controlled using electrocautery.  We also placed Tami   within the pelvis.  Once we felt comfortable about the hemostasis, we then   checked the rest of the abdomen.  We did close the previous ileostomy hernia   site with 1 interrupted 0 PDS suture in figure-of-8 fashion closing from the   inside.  We placed the omentum beneath the incision and then the fascia was   closed in running fashion using 0 PDS suture and tying in the middle.  The wound   was then irrigated and the skin was closed using 4-0 Monocryl.  The patient   tolerated the procedure well.  Final counts were correct x2.  The patient was   extubated and taken to the Postanesthesia Recovery Unit in stable condition.    The patient's attending surgeon Dr. Martin Greenberg was present and scrubbed for the   duration of the procedure.      NEW/IN  dd: 04/16/2018 09:41:49 (CDT)  td: 04/16/2018 10:39:12 (CDT)  Doc ID   #[id number]  Job ID #[id number]    CC:

## 2018-04-16 NOTE — TRANSFER OF CARE
"Anesthesia Transfer of Care Note    Patient: Bud Roberts JrRishi    Procedure(s) Performed: Procedure(s) (LRB):  RESECTION-COLON-LOW ANTERIOR, colostomy (N/A)    Patient location: PACU    Anesthesia Type: general    Transport from OR: Transported from OR on 6-10 L/min O2 by face mask with adequate spontaneous ventilation    Post pain: adequate analgesia    Post assessment: no apparent anesthetic complications and tolerated procedure well    Post vital signs: stable    Level of consciousness: sedated    Nausea/Vomiting: no nausea/vomiting    Complications: none    Transfer of care protocol was followed      Last vitals:   Visit Vitals  /76 (BP Location: Right arm, Patient Position: Lying)   Pulse 80   Temp 36.3 °C (97.3 °F) (Temporal)   Resp 16   Ht 5' 7" (1.702 m)   Wt 90.7 kg (200 lb)   SpO2 100%   BMI 31.32 kg/m²     "

## 2018-04-16 NOTE — BRIEF OP NOTE
Id: [id number]  [facility]  Colon and Rectal Surgery  Operative Note    SUMMARY     Date of Procedure: 4/16/2018     Procedure: Procedure(s) (LRB):  RESECTION-COLON-LOW ANTERIOR, colostomy (N/A)     Surgeon(s) and Role:     * Martin Greenberg MD - Primary    Assisting Surgeon: None    Pre-Operative Diagnosis: History of rectal or anal cancer [Z85.048]  Incontinence of feces, unspecified fecal incontinence type [R15.9]    Post-Operative Diagnosis: Post-Op Diagnosis Codes:     * History of rectal or anal cancer [Z85.048]     * Incontinence of feces, unspecified fecal incontinence type [R15.9]    Anesthesia: General, Exparel 266 mg, Marcaine 0.25% (75 mg) Saline 10 cc preperironeal injection      Technical Procedures Used: removal of distal colon and sphincters closed  Description of the Findings of the Procedure: end descending colostomy  Significant Surgical Tasks Conducted by the Assistant(s), if Applicable:n/a    Complications: No    Estimated Blood Loss (EBL): 150 mL           Implants: * No implants in log *    Specimens:   Specimen (12h ago through future)    Start     Ordered    04/16/18 0901  Specimen to Pathology - Surgery  Once     Comments:  1.) Descending Colon - PERM      04/16/18 0900           Condition: Good    Disposition: PACU - hemodynamically stable.    Attestation: I was present and scrubbed for the entire procedure.

## 2018-04-16 NOTE — H&P
Bud Roberts  is a 47 y.o. male      Chief Complaint:        Chief Complaint   Patient presents with    Follow-up       incontinence         HPI: No improvement with Imodium     7/30/12 LAR and coloanal pulthrough for T3N0 adenoa     s/p ileostomy closure on 9/10/12. C-scope Sept 2016 neg scans     Has urgency and accidents.     ROS:        Constitutional: No fever, chills, activity or appetite change.      HENT: No hearing loss, facial swelling, neck pain or stiffness.       Eyes: No discharge, itching and visual disturbance.      Respiratory: No apnea, cough, choking or shortness of breath.       Cardiovascular: No leg swelling or chest pain      Gastrointestinal: No abdominal distention or change in bowel habbits     Genitourinary: No dysuria, frequency or flank pain.      Musculoskeletal: No arthralgias or gait problem.      Neurological: No dizziness, seizures or weakness.      Hematological: No adenopathy.      Psychiatric/Behavioral: No hallucinations or behavioral problems.         PE:    APPEARANCE: Well nourished, well developed, in no acute distress.   CHEST: Lungs clear. Normal respiratory effort.  CARDIOVASCULAR: Normal rhythm. No edema.  ABDOMEN: Soft. No tenderness or masses.  Rectum:  Normal skin, NST, no masses or tenderness    Musculoskeletal: Symmetric, normal range of motion and strength.   Neurological: Alert and oriented to person, place, and time. Normal reflexes.   Skin: Skin is warm and dry.   Psychiatric: Normal mood and affect. Behavior is normal and appropriate.      Impression: s/p LAR, LAR syndrome   PLAAAAAAN: End descending colostomy.  I have explained the procedure including indications, alternatives, expected outcomes and potential complications. The patient appears to understand and gives informed consent. The patient is medically ready for surgery.

## 2018-04-16 NOTE — INTERVAL H&P NOTE
The patient has been examined and the H&P has been reviewed:    I concur with the findings and no changes have occurred since H&P was written.    Anesthesia/Surgery risks, benefits and alternative options discussed and understood by patient/family.          Active Hospital Problems    Diagnosis  POA    Incontinence of feces [R15.9]  Yes      Resolved Hospital Problems    Diagnosis Date Resolved POA   No resolved problems to display.

## 2018-04-16 NOTE — NURSING TRANSFER
Nursing Transfer Note      4/16/2018     Transfer to: 626    Transfer via: Stretcher    Transfer with: IV Pump    Transported by: PCT    Medicines sent: N/A    Chart send with patient: Yes    Notified: spouse; Report called to [facility] Burgess    Patient reassessed at: [id number]

## 2018-04-16 NOTE — ANESTHESIA PREPROCEDURE EVALUATION
04/16/2018  Bud Roberts Jr. is a 48 y.o., male.    Anesthesia Evaluation    I have reviewed the Patient Summary Reports.     I have reviewed the Medications.     Review of Systems  Anesthesia Hx:  No problems with previous Anesthesia  History of prior surgery of interest to airway management or planning: Denies Family Hx of Anesthesia complications.   Denies Personal Hx of Anesthesia complications.   Social:  Non-Smoker, Social Alcohol Use    Hematology/Oncology:  Hematology Normal   Oncology Normal    -- Denies Anemia:    EENT/Dental:   chronic allergic rhinitis   Cardiovascular:  Cardiovascular Normal  Denies Hypertension.  Denies Dysrhythmias.   Denies Angina.        Pulmonary:  Pulmonary Normal  Denies Asthma.  Denies Shortness of breath.  Denies Recent URI.    Renal/:  Renal/ Normal     Hepatic/GI:   GERD    Musculoskeletal:  Musculoskeletal Normal    Neurological:  Neurology Normal Denies TIA.  Denies Headaches.    Endocrine:  Endocrine Normal    Dermatological:  Skin Normal    Psych:  Psychiatric Normal           Physical Exam  General:  Well nourished, Obesity    Airway/Jaw/Neck:  Airway Findings: Mouth Opening: Normal Tongue: Normal  General Airway Assessment: Adult  Mallampati: II  TM Distance: Normal, at least 6 cm  Jaw/Neck Findings:  Neck ROM: Normal ROM     Eyes/Ears/Nose:  EYES/EARS/NOSE FINDINGS: Normal   Dental:  Dental Findings: In tact   Chest/Lungs:  Chest/Lungs Findings: Normal Respiratory Rate     Heart/Vascular:  Heart Findings: Rate: Normal  Rhythm: Regular Rhythm     Abdomen:  Abdomen Findings: Normal    Musculoskeletal:  Musculoskeletal Findings: Normal   Skin:  Skin Findings: Normal    Mental Status:  Mental Status Findings:  Cooperative, Alert and Oriented         Anesthesia Plan  Type of Anesthesia, risks & benefits discussed:  Anesthesia Type:  general  Patient's Preference:  General  Intra-op Monitoring Plan: standard ASA monitors  Intra-op Monitoring Plan Comments:   Post Op Pain Control Plan: per primary service following discharge from PACU and IV/PO Opioids PRN  Post Op Pain Control Plan Comments:   Induction:   IV  Beta Blocker:  Patient is not currently on a Beta-Blocker (No further documentation required).       Informed Consent: Patient understands risks and agrees with Anesthesia plan.  Questions answered. Anesthesia consent signed with patient.  ASA Score: 2     Day of Surgery Review of History & Physical:    H&P update referred to the surgeon.         Ready For Surgery From Anesthesia Perspective.

## 2018-04-16 NOTE — PLAN OF CARE
Pt AAOx4. Complaints of mild abdominal incisional pain. PRN pain medication administered as ordered with improvement in pain verbalized by patient. Midline incision remains CDI; colostomy site WNL with small amount of serosanguineous drainage. Steve in place draining john urine. VSS. Denies nausea. Safety maintained.

## 2018-04-16 NOTE — ANESTHESIA POSTPROCEDURE EVALUATION
"Anesthesia Post Evaluation    Patient: Bud Roberts JrRishi    Procedure(s) Performed: Procedure(s) (LRB):  RESECTION-COLON-LOW ANTERIOR, colostomy (N/A)    Final Anesthesia Type: general  Patient location during evaluation: PACU  Patient participation: Yes- Able to Participate  Level of consciousness: awake and alert  Post-procedure vital signs: reviewed and stable  Pain management: adequate  Airway patency: patent  PONV status at discharge: No PONV  Anesthetic complications: no      Cardiovascular status: blood pressure returned to baseline and hemodynamically stable  Respiratory status: unassisted and spontaneous ventilation  Hydration status: euvolemic  Follow-up not needed.        Visit Vitals  BP (!) 112/59 (BP Location: Right arm, Patient Position: Lying)   Pulse 71   Temp 36.6 °C (97.9 °F) (Temporal)   Resp 10   Ht 5' 7" (1.702 m)   Wt 90.7 kg (200 lb)   SpO2 96%   BMI 31.32 kg/m²       Pain/Carrillo Score: Pain Assessment Performed: Yes (4/16/2018 11:30 AM)  Presence of Pain: complains of pain/discomfort (4/16/2018 11:30 AM)  Pain Rating Prior to Med Admin: 5 (4/16/2018 10:50 AM)  Pain Rating Post Med Admin: 4 (4/16/2018 11:30 AM)  Carrillo Score: 10 (4/16/2018 11:30 AM)      "

## 2018-04-16 NOTE — PLAN OF CARE
Problem: Patient Care Overview  Goal: Plan of Care Review  Outcome: Revised  Patient arrived from PACU around 1200. POC reviewed with patient and spouse who verbalized understanding. VSS on room air. Slightly low BP noted during vitals - will reassess at 1600. AAOX4. Remains free of falls and injury. TEDS and SCDS in place.    ML with dermabond clean, dry intact - slight serosanguinous drainage noted. LUQ colostomy intact and patent - no output noted. Steve intact and patent with yellow urine. Patient complaining of pain in LT eye since surgery - ordered NS drops PRN - Christine Ch NP stated if symptoms get worse to notify anesthesia.     IVF infusing per MAR. Tolerating clear liquid diet, denies nausea. Pain controlled with PRN medications per MAR - pain noted in abdomen and rectum area. Educated and assisted on IS use. Patient denies chest pain & SOB. No acute events. No distress noted. Bed in lowest position, call light within reach, frequent rounds made for safety.     WCTM.

## 2018-04-17 LAB
ANION GAP SERPL CALC-SCNC: 8 MMOL/L
BASOPHILS # BLD AUTO: 0 K/UL
BASOPHILS NFR BLD: 0 %
BUN SERPL-MCNC: 10 MG/DL
CALCIUM SERPL-MCNC: 8.4 MG/DL
CHLORIDE SERPL-SCNC: 109 MMOL/L
CO2 SERPL-SCNC: 20 MMOL/L
CREAT SERPL-MCNC: 0.8 MG/DL
DIFFERENTIAL METHOD: ABNORMAL
EOSINOPHIL # BLD AUTO: 0 K/UL
EOSINOPHIL NFR BLD: 0.1 %
ERYTHROCYTE [DISTWIDTH] IN BLOOD BY AUTOMATED COUNT: 11.8 %
EST. GFR  (AFRICAN AMERICAN): >60 ML/MIN/1.73 M^2
EST. GFR  (NON AFRICAN AMERICAN): >60 ML/MIN/1.73 M^2
GLUCOSE SERPL-MCNC: 104 MG/DL
HCT VFR BLD AUTO: 33.4 %
HGB BLD-MCNC: 12 G/DL
IMM GRANULOCYTES # BLD AUTO: 0.02 K/UL
IMM GRANULOCYTES NFR BLD AUTO: 0.2 %
LYMPHOCYTES # BLD AUTO: 1.4 K/UL
LYMPHOCYTES NFR BLD: 14.9 %
MAGNESIUM SERPL-MCNC: 2.2 MG/DL
MCH RBC QN AUTO: 33.2 PG
MCHC RBC AUTO-ENTMCNC: 35.9 G/DL
MCV RBC AUTO: 93 FL
MONOCYTES # BLD AUTO: 0.9 K/UL
MONOCYTES NFR BLD: 9.6 %
NEUTROPHILS # BLD AUTO: 7 K/UL
NEUTROPHILS NFR BLD: 75.2 %
NRBC BLD-RTO: 0 /100 WBC
PHOSPHATE SERPL-MCNC: 3.1 MG/DL
PLATELET # BLD AUTO: 141 K/UL
PMV BLD AUTO: 10 FL
POTASSIUM SERPL-SCNC: 3.9 MMOL/L
RBC # BLD AUTO: 3.61 M/UL
SODIUM SERPL-SCNC: 137 MMOL/L
WBC # BLD AUTO: 9.25 K/UL

## 2018-04-17 PROCEDURE — 20600001 HC STEP DOWN PRIVATE ROOM

## 2018-04-17 PROCEDURE — 36415 COLL VENOUS BLD VENIPUNCTURE: CPT

## 2018-04-17 PROCEDURE — 25000003 PHARM REV CODE 250: Performed by: COLON & RECTAL SURGERY

## 2018-04-17 PROCEDURE — 85025 COMPLETE CBC W/AUTO DIFF WBC: CPT

## 2018-04-17 PROCEDURE — 80048 BASIC METABOLIC PNL TOTAL CA: CPT

## 2018-04-17 PROCEDURE — 63600175 PHARM REV CODE 636 W HCPCS: Performed by: SURGERY

## 2018-04-17 PROCEDURE — 25000003 PHARM REV CODE 250: Performed by: STUDENT IN AN ORGANIZED HEALTH CARE EDUCATION/TRAINING PROGRAM

## 2018-04-17 PROCEDURE — 97161 PT EVAL LOW COMPLEX 20 MIN: CPT

## 2018-04-17 PROCEDURE — 25000003 PHARM REV CODE 250: Performed by: SURGERY

## 2018-04-17 PROCEDURE — 84100 ASSAY OF PHOSPHORUS: CPT

## 2018-04-17 PROCEDURE — 83735 ASSAY OF MAGNESIUM: CPT

## 2018-04-17 RX ORDER — IBUPROFEN 400 MG/1
400 TABLET ORAL EVERY 6 HOURS
Status: DISCONTINUED | OUTPATIENT
Start: 2018-04-17 | End: 2018-04-20 | Stop reason: HOSPADM

## 2018-04-17 RX ADMIN — IBUPROFEN 400 MG: 400 TABLET, FILM COATED ORAL at 05:04

## 2018-04-17 RX ADMIN — OXYCODONE HYDROCHLORIDE 10 MG: 5 TABLET ORAL at 06:04

## 2018-04-17 RX ADMIN — OXYCODONE HYDROCHLORIDE 10 MG: 5 TABLET ORAL at 11:04

## 2018-04-17 RX ADMIN — OXYCODONE HYDROCHLORIDE 10 MG: 5 TABLET ORAL at 01:04

## 2018-04-17 RX ADMIN — OXYCODONE HYDROCHLORIDE 10 MG: 5 TABLET ORAL at 07:04

## 2018-04-17 RX ADMIN — SODIUM CHLORIDE, SODIUM LACTATE, POTASSIUM CHLORIDE, AND CALCIUM CHLORIDE: .6; .31; .03; .02 INJECTION, SOLUTION INTRAVENOUS at 06:04

## 2018-04-17 RX ADMIN — MUPIROCIN 1 G: 20 OINTMENT TOPICAL at 08:04

## 2018-04-17 RX ADMIN — IBUPROFEN 400 MG: 400 TABLET, FILM COATED ORAL at 11:04

## 2018-04-17 RX ADMIN — ACETAMINOPHEN 1000 MG: 10 INJECTION, SOLUTION INTRAVENOUS at 06:04

## 2018-04-17 RX ADMIN — PANTOPRAZOLE SODIUM 40 MG: 40 TABLET, DELAYED RELEASE ORAL at 08:04

## 2018-04-17 RX ADMIN — OXYCODONE HYDROCHLORIDE 10 MG: 5 TABLET ORAL at 02:04

## 2018-04-17 RX ADMIN — MORPHINE SULFATE 3 MG: 2 INJECTION, SOLUTION INTRAMUSCULAR; INTRAVENOUS at 05:04

## 2018-04-17 RX ADMIN — ENOXAPARIN SODIUM 40 MG: 100 INJECTION SUBCUTANEOUS at 05:04

## 2018-04-17 RX ADMIN — IBUPROFEN 400 MG: 800 INJECTION INTRAVENOUS at 06:04

## 2018-04-17 RX ADMIN — OXYCODONE HYDROCHLORIDE 10 MG: 5 TABLET ORAL at 10:04

## 2018-04-17 NOTE — PLAN OF CARE
POD # 1 s/p resection of coloanal anastomosis and end colostomy. CM completed discharge assessment and planning with patient. Patient verbalized understanding. Patient currently lives with wife, Connie ([phone]). Patient has good family support and transportation home. Patient reports he is familiar with ostomy care and doesn't desire to receive Home Health services at this time. CM and SW will continue to follow for any additional needs.    PCP: Bryce Deleon MD    Pharmacy:   [id number] - [place], [place] - [id number]  [address] [place] [address]  Phone: [phone] Fax: [phone]    Payor: ROBERTO / Plan:  RESERVE [facility] / Product Type: [facility] /      04/17/18 1015   Discharge Assessment   Assessment Type Discharge Planning Assessment   Confirmed/corrected address and phone number on facesheet? Yes   Assessment information obtained from? Patient   Communicated expected length of stay with patient/caregiver yes   Prior to hospitilization cognitive status: Alert/Oriented   Prior to hospitalization functional status: Independent   Current cognitive status: Alert/Oriented   Current Functional Status: Independent   Lives With spouse  (wife, Connie ([phone]))   Able to Return to Prior Arrangements yes   Is patient able to care for self after discharge? Yes   Who are your caregiver(s) and their phone number(s)? self care   Patient's perception of discharge disposition home or selfcare   Readmission Within The Last 30 Days no previous admission in last 30 days   Patient currently being followed by outpatient case management? No   Patient currently receives any other outside agency services? No   Equipment Currently Used at Home none   Do you have any problems affording any of your prescribed medications? TBD   Is the patient taking medications as prescribed? yes   Does the patient have transportation home? Yes   Dialysis Name and Scheduled days N/A   Does the  patient receive services at the [facility]? No   Discharge Plan A Home;Home with family   Patient/Family In Agreement With Plan yes

## 2018-04-17 NOTE — PLAN OF CARE
Problem: Patient Care Overview  Goal: Plan of Care Review  Outcome: Ongoing (interventions implemented as appropriate)  POC reviewed with patient and spouse who both verbalized understanding. AAOx4. VSS to baseline on room air. Midline abdominal incision CDI with dermabond. Pain controlled with PRN medications per MAR, denies any nausea. LLQ colostomy in place, no output, positive flatus. Steve intact draining adequate output, clear yellow. IVF infusing, tolerating clear liquid diet. BLE TEDS/SCDS in place.Remains free from falls and injury. No acute events. No distress noted. Will continue to monitor. Wife at bedside.

## 2018-04-17 NOTE — PROGRESS NOTES
[facility]  Colorectal Surgery  Progress Note    Patient Name: Bud Roberts Jr.  MRN: [id number]  Admission Date: 4/16/2018  Hospital Length of Stay: 1 days  Attending Physician: Martin Greenberg MD    Subjective:     Interval History: MANJULA overnight. Steve removed this a.m. Pain well controlled this morning. Not OOB yet.     Post-Op Info:  Procedure(s) (LRB):  RESECTION-COLON-LOW ANTERIOR, colostomy (N/A)   1 Day Post-Op      Medications:  Continuous Infusions:   lactated ringers 50 mL/hr at 04/17/18 0602     Scheduled Meds:   enoxaparin  40 mg Subcutaneous Daily    ibuprofen  400 mg Intravenous Q6H    mupirocin  1 g Nasal BID    pantoprazole  40 mg Oral Daily     PRN Meds:   calcium carbonate    diphenhydrAMINE    metoclopramide HCl    morphine    naloxone    ondansetron    oxyCODONE    ramelteon    sodium chloride 2%        Objective:     Vital Signs (Most Recent):  Temp: 98 °F (36.7 °C) (04/17/18 0722)  Pulse: 60 (04/17/18 0722)  Resp: 16 (04/17/18 0722)  BP: (!) 92/54 (04/17/18 0722)  SpO2: 97 % (04/17/18 0722) Vital Signs (24h Range):  Temp:  [97.3 °F (36.3 °C)-98.6 °F (37 °C)] 98 °F (36.7 °C)  Pulse:  [60-84] 60  Resp:  [10-20] 16  SpO2:  [95 %-100 %] 97 %  BP: ()/(54-76) 92/54     Intake/Output - Last 3 Shifts       04/15 0700 - 04/16 0659 04/16 0700 - 04/17 0659 04/17 0700 - 04/18 0659    P.O.  1800     I.V. (mL/kg)  2915.8 (32.1)     IV Piggyback  400     Total Intake(mL/kg)  5115.8 (56.4)     Urine (mL/kg/hr)  4915 (2.3)     Stool  0 (0)     Blood  150 (0.1)     Total Output   5065      Net   +50.8             Stool Occurrence  0 x 0 x          Physical Exam   Constitutional: He is oriented to person, place, and time. He appears well-developed and well-nourished.   HENT:   Head: Normocephalic and atraumatic.   Eyes: EOM are normal.   Cardiovascular: Normal rate.    Pulmonary/Chest: Effort normal. No respiratory distress.   Abdominal: Soft. He exhibits no distension and no  mass. There is tenderness (appropriate postop). There is no rebound and no guarding. No hernia.   Stoma pink with gas in bag  Incision with dermabond in place   Musculoskeletal: Normal range of motion.   Neurological: He is alert and oriented to person, place, and time.   Skin: Skin is warm. Capillary refill takes less than 2 seconds.   Psychiatric: He has a normal mood and affect. His behavior is normal.   Nursing note and vitals reviewed.      Significant Labs:  BMP (Last 3 Results):   Recent Labs  Lab 04/17/18  0506         K 3.9      CO2 20*   BUN 10   CREATININE 0.8   CALCIUM 8.4*   MG 2.2     CBC (Last 3 Results):   Recent Labs  Lab 04/17/18  0506   WBC 9.25   RBC 3.61*   HGB 12.0*   HCT 33.4*   *   MCV 93   MCH 33.2*   MCHC 35.9       Significant Diagnostics:  None    Assessment/Plan:     Incontinence of feces    Now 1 Day Post-Op s/p resection of coloanal anastomosis and end colostomy    - Advance diet to low residue as tolerated  - D/C IVF if tolerates PO  - Steve out  - Ambulate TID  - Stoma teaching  - Lovenox ppx              Alicia Peña MD  Colorectal Surgery  [facility]

## 2018-04-17 NOTE — PROGRESS NOTES
Pharmacist Intervention IV to PO Note    Bud Roberts Jr. is a 48 y.o. male being treated with IV medication ibuprofen    Patient Data:    Vital Signs (Most Recent):  Temp: 98 °F (36.7 °C) (04/17/18 0722)  Pulse: 60 (04/17/18 0722)  Resp: 16 (04/17/18 0722)  BP: (!) 92/54 (04/17/18 0722)  SpO2: 97 % (04/17/18 0722)   Vital Signs (72h Range):  Temp:  [97.3 °F (36.3 °C)-98.6 °F (37 °C)]   Pulse:  [60-84]   Resp:  [10-20]   BP: ()/(54-76)   SpO2:  [95 %-100 %]      CBC:    Recent Labs     Lab 04/17/18  0506   WBC 9.25   RBC 3.61*   HGB 12.0*   HCT 33.4*   *   MCV 93   MCH 33.2*   MCHC 35.9     CMP:     Recent Labs     Lab 04/17/18  0506      CALCIUM 8.4*      K 3.9   CO2 20*      BUN 10   CREATININE 0.8       Dietary Orders:  Diet Orders            Diet low fiber/residue: Low Fiber/Residue starting at 04/17 0647            Based on the following criteria, this patient qualifies for intravenous to oral conversion:  [x] The patients gastrointestinal tract is functioning (tolerating medications via oral or enteral route for 24 hours and tolerating food or enteral feeds for 24 hours).  [x] The patient is hemodynamically stable for 24 hours (heart rate <100 beats per minute, systolic blood pressure >99 mm Hg, and respiratory rate <20 breaths per minute).  [x] The patient shows clinical improvement (afebrile for at least 24 hours and white blood cell count downtrending or normalized). Additionally, the patient must be non-neutropenic (absolute neutrophil count >500 cells/mm3).  [x] For antimicrobials, the patient has received IV therapy for at least 24 hours.    IV medication ibuprofen 400 mg every 6 hours  will be changed to oral medication ibuprofen 400 mg every 6 hours    Pharmacist's Name: Archana Deleon  Pharmacist's Extension: [phone]

## 2018-04-17 NOTE — PLAN OF CARE
Problem: Patient Care Overview  Goal: Plan of Care Review  Outcome: Ongoing (interventions implemented as appropriate)  Plan of care reviewed with pt. Pt AAOx's4, vital signs stable. Currently on room air. Few complaints of pain relieved with prn meds. Midline dry and intact. LLQ colostomy intact. PT tolerating a low fiber diet well. No nausea or vomiting noted. Pt ambulated independently and remains free of falls or injures. Bed in low and locked position with call light in reach. No acute events at this time. WCTM

## 2018-04-17 NOTE — ASSESSMENT & PLAN NOTE
Now 1 Day Post-Op s/p resection of coloanal anastomosis and end colostomy    - Advance diet to low residue as tolerated  - D/C IVF if tolerates PO  - Steve out  - Ambulate TID  - Stoma teaching  - [facility]

## 2018-04-17 NOTE — SUBJECTIVE & OBJECTIVE
Subjective:     Interval History: MANJULA overnight. Steve removed this a.m. Pain well controlled this morning. Not OOB yet.     Post-Op Info:  Procedure(s) (LRB):  RESECTION-COLON-LOW ANTERIOR, colostomy (N/A)   1 Day Post-Op      Medications:  Continuous Infusions:   lactated ringers 50 mL/hr at 04/17/18 0602     Scheduled Meds:   enoxaparin  40 mg Subcutaneous Daily    ibuprofen  400 mg Intravenous Q6H    mupirocin  1 g Nasal BID    pantoprazole  40 mg Oral Daily     PRN Meds:   calcium carbonate    diphenhydrAMINE    metoclopramide HCl    morphine    naloxone    ondansetron    oxyCODONE    ramelteon    sodium chloride 2%        Objective:     Vital Signs (Most Recent):  Temp: 98 °F (36.7 °C) (04/17/18 0722)  Pulse: 60 (04/17/18 0722)  Resp: 16 (04/17/18 0722)  BP: (!) 92/54 (04/17/18 0722)  SpO2: 97 % (04/17/18 0722) Vital Signs (24h Range):  Temp:  [97.3 °F (36.3 °C)-98.6 °F (37 °C)] 98 °F (36.7 °C)  Pulse:  [60-84] 60  Resp:  [10-20] 16  SpO2:  [95 %-100 %] 97 %  BP: ()/(54-76) 92/54     Intake/Output - Last 3 Shifts       04/15 0700 - 04/16 0659 04/16 0700 - 04/17 0659 04/17 0700 - 04/18 0659    P.O.  1800     I.V. (mL/kg)  2915.8 (32.1)     IV Piggyback  400     Total Intake(mL/kg)  5115.8 (56.4)     Urine (mL/kg/hr)  4915 (2.3)     Stool  0 (0)     Blood  150 (0.1)     Total Output   5065      Net   +50.8             Stool Occurrence  0 x 0 x          Physical Exam   Constitutional: He is oriented to person, place, and time. He appears well-developed and well-nourished.   HENT:   Head: Normocephalic and atraumatic.   Eyes: EOM are normal.   Cardiovascular: Normal rate.    Pulmonary/Chest: Effort normal. No respiratory distress.   Abdominal: Soft. He exhibits no distension and no mass. There is tenderness (appropriate postop). There is no rebound and no guarding. No hernia.   Stoma pink with gas in bag  Incision with dermabond in place   Musculoskeletal: Normal range of motion.    Neurological: He is alert and oriented to person, place, and time.   Skin: Skin is warm. Capillary refill takes less than 2 seconds.   Psychiatric: He has a normal mood and affect. His behavior is normal.   Nursing note and vitals reviewed.      Significant Labs:  BMP (Last 3 Results):   Recent Labs  Lab 04/17/18  0506         K 3.9      CO2 20*   BUN 10   CREATININE 0.8   CALCIUM 8.4*   MG 2.2     CBC (Last 3 Results):   Recent Labs  Lab 04/17/18  0506   WBC 9.25   RBC 3.61*   HGB 12.0*   HCT 33.4*   *   MCV 93   MCH 33.2*   MCHC 35.9       Significant Diagnostics:  None

## 2018-04-17 NOTE — PT/OT/SLP EVAL
Physical Therapy Evaluation and Discharge Note    Patient Name:  Bud Roberts Jr.   MRN:  [id number]    Recommendations:     Discharge Recommendations:  home   Discharge Equipment Recommendations: none   Barriers to discharge: None    Assessment:     Bud Roberts Jr. is a 48 y.o. male admitted with a medical diagnosis of <principal problem not specified>. Patient demonstrated excellent participation despite pain. Level of assistance required SBA - mod I. Able to tolerate gait training and bed mobility. Recommending home with no needs. At this time, patient is functioning at their prior level of function and does not require further acute PT services.     Recent Surgery: Procedure(s) (LRB):  RESECTION-COLON-LOW ANTERIOR, colostomy (N/A) 1 Day Post-Op    Plan:     During this hospitalization, patient does not require further acute PT services.  Please re-consult if situation changes.     Plan of Care Reviewed with: patient, family    Subjective     Communicated with nurse Smith prior to session.  Patient found with HOB elevated, family present, and in NAD upon PT entry to room, agreeable to evaluation.      Chief Complaint: pain  Patient comments/goals: to return home at [facility] and back to work  Pain/Comfort:  · Pain Rating 1: 3/10  · Location 1: abdomen  · Pain Addressed 1: Reposition, Distraction, Cessation of Activity    Patients cultural, spiritual, Buddhist conflicts given the current situation: none stated    Living Environment:  Patient lives with wife in [address]SH with no WADE to enter. He works at the [employer] in [place].  Prior to admission, patients level of function was independent with all activities and a community ambulator.  Patient has the following equipment: none.  DME owned (not currently used): none.  Upon discharge, patient will have assistance from spouse.    Objective:     Patient found with: peripheral IV, PCA     General Precautions: Standard,     Orthopedic Precautions:N/A   Braces: N/A      Exams:  · Cognitive Exam:  Patient is oriented to Person, Place, Time and Situation and follows 100% of multistep commands   · Postural Exam:  Patient presented with the following abnormalities:    · -       Forward head  · Sensation:    · -       Impaired  light/touch L foot  · Skin Integrity/Edema:      · -       Skin integrity: Visible skin intact  · -       Edema: None noted BLE/BUE  · RUE ROM: WFL  · RUE Strength: WFL  · LUE ROM: WFL  · LUE Strength: WFL  · RLE ROM: WFL  · RLE Strength: WFL  · LLE ROM: WFL  · LLE Strength: WFL   · Balance: normal in sitting, normal in standing    Functional Mobility:  · Bed Mobility:   Rolling to the left: Modified [place]   Supine to Sit: Modified [place]   Scooting at EOB: Modified [place]    · Sitting Balance at Edge of Bed:   Assistance Level Required: [place]   Time: 7 minutes   Postural deviations noted:    -       Forward head    · Transfers:   Sit to Stand: Supervision or Set-up Assistance with No Assistive Device x 2 trials   Stand to Sit: Supervision or Set-up Assistance with No Assistive Device x 2 trials   Bed to Chair: Stand Pivot with Supervision or Set-up Assistance with No Assistive Device x 1 trial    · Gait:   Distance Ambulated: Pt ambulated x200 feet in hallway. Pt demo forward head posture.   Assistance level: Supervision   Assistive Device used:  No Assistive Device   Gait Pattern: 2-point gait    AM-PAC 6 CLICK MOBILITY  Total Score:24       Therapeutic Activities and Exercises:  PT arrived to patient's room to find patient resting quietly; agreeable to PT session. Patient performed mobility as above with non-skid socks in place.    · Patient Education:   · PT POC and PT role in facility  · Gait training  · PT plan to dc from PT services   · EOB activity:  · Functional testing   Bedside table in front of patient and area set up for function, convenience, and safety. RN aware of patient's mobility needs and status.  Questions/concerns addressed within PT scope of practice; patient and family with no further questions.     Patient left up in chair with all lines intact, call button in reach and family present.    GOALS:    Physical Therapy Goals     Not on file          Multidisciplinary Problems (Resolved)        Problem: Physical Therapy Goal    Goal Priority Disciplines Outcome Goal Variances Interventions   Physical Therapy Goal   (Resolved)     PT/OT, PT Outcome(s) achieved                     History:     Past Medical History:   Diagnosis Date    Colon polyp     cancer    Reflux gastritis     Sinus drainage        Past Surgical History:   Procedure Laterality Date    COLON SURGERY      VASECTOMY      and reversal       Clinical Decision Making:     History  Co-morbidities and personal factors that may impact the plan of care Examination  Body Structures and Functions, activity limitations and participation restrictions that may impact the plan of care Clinical Presentation   Decision Making/ Complexity Score   Co-morbidities:   [] Time since onset of injury / illness / exacerbation  [] Status of current condition  []Patient's cognitive status and safety concerns    [] Multiple Medical Problems (see med hx)  Personal Factors:   [] Patient's age  [] Prior Level of function   [] Patient's home situation (environment and family support)  [] Patient's level of motivation  [] Expected progression of patient      HISTORY:(criteria)    [] 41431 - no personal factors/history    [] 14329 - has 1-2 personal factor/comorbidity     [] 10500 - has >3 personal factor/comorbidity     Body Regions:  [] Objective examination findings  [] Head     []  Neck  [] Trunk   [] Upper Extremity  [] Lower Extremity    Body Systems:  [] For communication ability, affect, cognition, language, and learning style: the assessment of the ability to make needs known, consciousness, orientation (person, place, and time), expected emotional /behavioral  responses, and learning preferences (eg, learning barriers, education  needs)  [] For the neuromuscular system: a general assessment of gross coordinated movement (eg, balance, gait, locomotion, transfers, and transitions) and motor function  (motor control and motor learning)  [] For the musculoskeletal system: the assessment of gross symmetry, gross range of motion, gross strength, height, and weight  [] For the integumentary system: the assessment of pliability(texture), presence of scar formation, skin color, and skin integrity  [] For cardiovascular/pulmonary system: the assessment of heart rate, respiratory rate, blood pressure, and edema     Activity limitations:    [] Patient's cognitive status and saf ety concerns          [] Status of current condition      [] Weight bearing restriction  [] Cardiopulmunary Restriction    Participation Restrictions:   [] Goals and goal agreement with the patient     [] Rehab potential (prognosis) and probable outcome      Examination of Body System: (criteria)    [] 52290 - addressing 1-2 elements    [] 93030 - addressing a total of 3 or more elements     [] 38201 -  Addressing a total of 4 or more elements         Clinical Presentation: (criteria)  Choose one     On examination of body system using standardized tests and measures patient presents with (CHOOSE ONE) elements from any of the following: body structures and functions, activity limitations, and/or participation restrictions.  Leading to a clinical presentation that is considered (CHOOSE ONE)                              Clinical Decision Making  (Eval Complexity):  Choose One     Time Tracking:     PT Received On: 04/17/18  PT Start Time: 1348     PT Stop Time: 1401  PT Total Time (min): 13 min     Billable Minutes: Evaluation 13      Princess Gonzalez, PT  04/17/2018

## 2018-04-17 NOTE — PLAN OF CARE
Problem: Physical Therapy Goal  Goal: Physical Therapy Goal  Outcome: Outcome(s) achieved Date Met: 04/17/18  PT evaluation complete. No goals established as patient is at previous level of function; discharge from PT services.     Princess Gonzalez PT, DPT  4/17/2018  [phone]

## 2018-04-17 NOTE — NURSING
Steve catheter removed per MD order, no complications. 400cc emptied from bag before removal. Due to void by 1400, urinal at bedside. Patient verbalized understanding.

## 2018-04-17 NOTE — CONSULTS
Consult received on patient's colostomy education. Patient POD 1 for end colostomy. Patient resting comfortably in bed, states was about to take a nap. Discussed ostomy nurses role and provided colostomy educational booklet. Patient states had ileostomy approx 5yrs ago and would need a little refresher course. Discussed with patient to return tomorrow for ostomy lesson with wife present. No additional questions noted at this time. Ostomy dept to continue to follow.

## 2018-04-18 LAB
ANION GAP SERPL CALC-SCNC: 10 MMOL/L
BASOPHILS # BLD AUTO: 0.02 K/UL
BASOPHILS NFR BLD: 0.3 %
BUN SERPL-MCNC: 10 MG/DL
CALCIUM SERPL-MCNC: 8.3 MG/DL
CHLORIDE SERPL-SCNC: 108 MMOL/L
CO2 SERPL-SCNC: 23 MMOL/L
CREAT SERPL-MCNC: 0.9 MG/DL
DIFFERENTIAL METHOD: ABNORMAL
EOSINOPHIL # BLD AUTO: 0.1 K/UL
EOSINOPHIL NFR BLD: 1.2 %
ERYTHROCYTE [DISTWIDTH] IN BLOOD BY AUTOMATED COUNT: 11.9 %
EST. GFR  (AFRICAN AMERICAN): >60 ML/MIN/1.73 M^2
EST. GFR  (NON AFRICAN AMERICAN): >60 ML/MIN/1.73 M^2
GLUCOSE SERPL-MCNC: 93 MG/DL
HCT VFR BLD AUTO: 31.6 %
HGB BLD-MCNC: 11.4 G/DL
IMM GRANULOCYTES # BLD AUTO: 0.03 K/UL
IMM GRANULOCYTES NFR BLD AUTO: 0.5 %
LYMPHOCYTES # BLD AUTO: 1.6 K/UL
LYMPHOCYTES NFR BLD: 27.3 %
MAGNESIUM SERPL-MCNC: 2.1 MG/DL
MCH RBC QN AUTO: 33 PG
MCHC RBC AUTO-ENTMCNC: 36.1 G/DL
MCV RBC AUTO: 92 FL
MONOCYTES # BLD AUTO: 0.6 K/UL
MONOCYTES NFR BLD: 9.7 %
NEUTROPHILS # BLD AUTO: 3.6 K/UL
NEUTROPHILS NFR BLD: 61 %
NRBC BLD-RTO: 0 /100 WBC
PHOSPHATE SERPL-MCNC: 2.5 MG/DL
PLATELET # BLD AUTO: 118 K/UL
PMV BLD AUTO: 10.1 FL
POTASSIUM SERPL-SCNC: 3.9 MMOL/L
RBC # BLD AUTO: 3.45 M/UL
SODIUM SERPL-SCNC: 141 MMOL/L
WBC # BLD AUTO: 5.96 K/UL

## 2018-04-18 PROCEDURE — 63600175 PHARM REV CODE 636 W HCPCS: Performed by: SURGERY

## 2018-04-18 PROCEDURE — 20600001 HC STEP DOWN PRIVATE ROOM

## 2018-04-18 PROCEDURE — 83735 ASSAY OF MAGNESIUM: CPT

## 2018-04-18 PROCEDURE — 36415 COLL VENOUS BLD VENIPUNCTURE: CPT

## 2018-04-18 PROCEDURE — 80048 BASIC METABOLIC PNL TOTAL CA: CPT

## 2018-04-18 PROCEDURE — 85025 COMPLETE CBC W/AUTO DIFF WBC: CPT

## 2018-04-18 PROCEDURE — 25000003 PHARM REV CODE 250: Performed by: SURGERY

## 2018-04-18 PROCEDURE — 25000003 PHARM REV CODE 250: Performed by: COLON & RECTAL SURGERY

## 2018-04-18 PROCEDURE — 84100 ASSAY OF PHOSPHORUS: CPT

## 2018-04-18 PROCEDURE — 25000003 PHARM REV CODE 250: Performed by: STUDENT IN AN ORGANIZED HEALTH CARE EDUCATION/TRAINING PROGRAM

## 2018-04-18 RX ADMIN — IBUPROFEN 400 MG: 400 TABLET, FILM COATED ORAL at 06:04

## 2018-04-18 RX ADMIN — OXYCODONE HYDROCHLORIDE 10 MG: 5 TABLET ORAL at 07:04

## 2018-04-18 RX ADMIN — OXYCODONE HYDROCHLORIDE 10 MG: 5 TABLET ORAL at 03:04

## 2018-04-18 RX ADMIN — MUPIROCIN 1 G: 20 OINTMENT TOPICAL at 09:04

## 2018-04-18 RX ADMIN — PANTOPRAZOLE SODIUM 40 MG: 40 TABLET, DELAYED RELEASE ORAL at 08:04

## 2018-04-18 RX ADMIN — OXYCODONE HYDROCHLORIDE 10 MG: 5 TABLET ORAL at 11:04

## 2018-04-18 RX ADMIN — IBUPROFEN 400 MG: 400 TABLET, FILM COATED ORAL at 11:04

## 2018-04-18 RX ADMIN — IBUPROFEN 400 MG: 400 TABLET, FILM COATED ORAL at 05:04

## 2018-04-18 RX ADMIN — CALCIUM CARBONATE (ANTACID) CHEW TAB 500 MG 1000 MG: 500 CHEW TAB at 09:04

## 2018-04-18 RX ADMIN — OXYCODONE HYDROCHLORIDE 10 MG: 5 TABLET ORAL at 05:04

## 2018-04-18 RX ADMIN — MUPIROCIN 1 G: 20 OINTMENT TOPICAL at 08:04

## 2018-04-18 RX ADMIN — ENOXAPARIN SODIUM 40 MG: 100 INJECTION SUBCUTANEOUS at 05:04

## 2018-04-18 RX ADMIN — OXYCODONE HYDROCHLORIDE 10 MG: 5 TABLET ORAL at 09:04

## 2018-04-18 RX ADMIN — ONDANSETRON 4 MG: 2 INJECTION, SOLUTION INTRAMUSCULAR; INTRAVENOUS at 07:04

## 2018-04-18 NOTE — PLAN OF CARE
Problem: Patient Care Overview  Goal: Plan of Care Review  Outcome: Ongoing (interventions implemented as appropriate)  POC reviewed with patient and spouse who both verbalized understanding. AAOx4. VSS to baseline on room air. Midline abdominal incision CDI with dermabond. Pain controlled with PRN medications per MAR, denies any nausea. LLQ colostomy in place, no output, positive flatus. Tolerating low fiber/residue diet, IVF infusing, up with standby assist and voiding frequently without any difficulty. BLE TEDS/SCDS in place. Remains free from falls and injury. No acute events. No distress noted. Will continue to monitor. Wife at bedside.

## 2018-04-18 NOTE — PROGRESS NOTES
Patient seen for colostomy education. Patient's wife at bedside, attentive and involved in care. Educated patient and wife on pouch care: how often, removing pouch, cleaning peristomal skin, measuring/cutting/applying pouch and emptying pouch. Patient's stoma red well budded, 38mm, peristomal skin intact. No output noted, patient reports some flatus. Answered patient's and wife's questions. coloplast paula pouch [id number] applied. No additional questions noted. Wound care to follow.

## 2018-04-18 NOTE — PLAN OF CARE
Problem: Patient Care Overview  Goal: Plan of Care Review  Outcome: Ongoing (interventions implemented as appropriate)  Plan of care reviewed with pt. Pt AAOx's4, vital signs stable. Midline intact, LLQ colostomy intact. Few complaints of pain relieved with prn meds. Pt tolerating a regular diet well. No complaints of nausea or vomiting. Pt ambulates independently and remains free from falls or injuries. Currently on room air. Bed in low and locked position with call light in reach. TM

## 2018-04-19 LAB
ANION GAP SERPL CALC-SCNC: 6 MMOL/L
BASOPHILS # BLD AUTO: 0.01 K/UL
BASOPHILS NFR BLD: 0.1 %
BUN SERPL-MCNC: 9 MG/DL
CALCIUM SERPL-MCNC: 8.5 MG/DL
CHLORIDE SERPL-SCNC: 106 MMOL/L
CO2 SERPL-SCNC: 27 MMOL/L
CREAT SERPL-MCNC: 1 MG/DL
DIFFERENTIAL METHOD: ABNORMAL
EOSINOPHIL # BLD AUTO: 0.2 K/UL
EOSINOPHIL NFR BLD: 2.8 %
ERYTHROCYTE [DISTWIDTH] IN BLOOD BY AUTOMATED COUNT: 11.9 %
EST. GFR  (AFRICAN AMERICAN): >60 ML/MIN/1.73 M^2
EST. GFR  (NON AFRICAN AMERICAN): >60 ML/MIN/1.73 M^2
GLUCOSE SERPL-MCNC: 96 MG/DL
HCT VFR BLD AUTO: 32.6 %
HGB BLD-MCNC: 11.6 G/DL
IMM GRANULOCYTES # BLD AUTO: 0.05 K/UL
IMM GRANULOCYTES NFR BLD AUTO: 0.7 %
LYMPHOCYTES # BLD AUTO: 1.5 K/UL
LYMPHOCYTES NFR BLD: 22.5 %
MAGNESIUM SERPL-MCNC: 2.2 MG/DL
MCH RBC QN AUTO: 33.2 PG
MCHC RBC AUTO-ENTMCNC: 35.6 G/DL
MCV RBC AUTO: 93 FL
MONOCYTES # BLD AUTO: 0.7 K/UL
MONOCYTES NFR BLD: 10.3 %
NEUTROPHILS # BLD AUTO: 4.3 K/UL
NEUTROPHILS NFR BLD: 63.6 %
NRBC BLD-RTO: 0 /100 WBC
PHOSPHATE SERPL-MCNC: 3.8 MG/DL
PLATELET # BLD AUTO: 140 K/UL
PMV BLD AUTO: 10.1 FL
POTASSIUM SERPL-SCNC: 3.8 MMOL/L
RBC # BLD AUTO: 3.49 M/UL
SODIUM SERPL-SCNC: 139 MMOL/L
WBC # BLD AUTO: 6.79 K/UL

## 2018-04-19 PROCEDURE — 25000003 PHARM REV CODE 250: Performed by: STUDENT IN AN ORGANIZED HEALTH CARE EDUCATION/TRAINING PROGRAM

## 2018-04-19 PROCEDURE — 85025 COMPLETE CBC W/AUTO DIFF WBC: CPT

## 2018-04-19 PROCEDURE — 94761 N-INVAS EAR/PLS OXIMETRY MLT: CPT

## 2018-04-19 PROCEDURE — 20600001 HC STEP DOWN PRIVATE ROOM

## 2018-04-19 PROCEDURE — 63600175 PHARM REV CODE 636 W HCPCS: Performed by: SURGERY

## 2018-04-19 PROCEDURE — 84100 ASSAY OF PHOSPHORUS: CPT

## 2018-04-19 PROCEDURE — 36415 COLL VENOUS BLD VENIPUNCTURE: CPT

## 2018-04-19 PROCEDURE — 25000003 PHARM REV CODE 250: Performed by: SURGERY

## 2018-04-19 PROCEDURE — 99900035 HC TECH TIME PER 15 MIN (STAT)

## 2018-04-19 PROCEDURE — 83735 ASSAY OF MAGNESIUM: CPT

## 2018-04-19 PROCEDURE — 80048 BASIC METABOLIC PNL TOTAL CA: CPT

## 2018-04-19 PROCEDURE — 25000003 PHARM REV CODE 250: Performed by: COLON & RECTAL SURGERY

## 2018-04-19 RX ORDER — POLYETHYLENE GLYCOL 3350 17 G/17G
17 POWDER, FOR SOLUTION ORAL DAILY
Status: DISCONTINUED | OUTPATIENT
Start: 2018-04-19 | End: 2018-04-20 | Stop reason: HOSPADM

## 2018-04-19 RX ADMIN — PANTOPRAZOLE SODIUM 40 MG: 40 TABLET, DELAYED RELEASE ORAL at 08:04

## 2018-04-19 RX ADMIN — MUPIROCIN 1 G: 20 OINTMENT TOPICAL at 10:04

## 2018-04-19 RX ADMIN — OXYCODONE HYDROCHLORIDE 10 MG: 5 TABLET ORAL at 01:04

## 2018-04-19 RX ADMIN — IBUPROFEN 400 MG: 400 TABLET, FILM COATED ORAL at 12:04

## 2018-04-19 RX ADMIN — IBUPROFEN 400 MG: 400 TABLET, FILM COATED ORAL at 06:04

## 2018-04-19 RX ADMIN — MUPIROCIN 1 G: 20 OINTMENT TOPICAL at 08:04

## 2018-04-19 RX ADMIN — ENOXAPARIN SODIUM 40 MG: 100 INJECTION SUBCUTANEOUS at 05:04

## 2018-04-19 RX ADMIN — OXYCODONE HYDROCHLORIDE 10 MG: 5 TABLET ORAL at 06:04

## 2018-04-19 RX ADMIN — IBUPROFEN 400 MG: 400 TABLET, FILM COATED ORAL at 05:04

## 2018-04-19 RX ADMIN — IBUPROFEN 400 MG: 400 TABLET, FILM COATED ORAL at 11:04

## 2018-04-19 NOTE — PLAN OF CARE
Problem: Patient Care Overview  Goal: Plan of Care Review  Outcome: Ongoing (interventions implemented as appropriate)  POC reviewed with patient and spouse who both verbalized understanding. AAOx4. VSS to baseline on room air. Midline abdominal incision CDI with dermabond. Pain and nausea controlled with PRN medications per MAR which relieved symptoms. LLQ colostomy in place, no output, positive flatus. Tolerating low fiber/residue diet. Up ad kg and voiding frequently without any difficulty. BLE TEDS/SCDS in place. Remains free from falls and injury. No acute events. No distress noted. Will continue to monitor. Wife at bedside.

## 2018-04-19 NOTE — SUBJECTIVE & OBJECTIVE
Subjective:     Interval History: no acute events overnite, flatus only from ostomy, eating a little better    Post-Op Info:  Procedure(s) (LRB):  RESECTION-COLON-LOW ANTERIOR, colostomy (N/A)   3 Days Post-Op      Medications:  Continuous Infusions:   lactated ringers 50 mL/hr at 04/17/18 0602     Scheduled Meds:   enoxaparin  40 mg Subcutaneous Daily    ibuprofen  400 mg Oral Q6H    mupirocin  1 g Nasal BID    pantoprazole  40 mg Oral Daily    polyethylene glycol  17 g Oral Daily     PRN Meds:   calcium carbonate    diphenhydrAMINE    metoclopramide HCl    morphine    naloxone    ondansetron    oxyCODONE    ramelteon    sodium chloride 2%        Objective:     Vital Signs (Most Recent):  Temp: 96.7 °F (35.9 °C) (04/19/18 1113)  Pulse: 70 (04/19/18 1113)  Resp: 18 (04/19/18 1113)  BP: 118/71 (04/19/18 1113)  SpO2: 95 % (04/19/18 1113) Vital Signs (24h Range):  Temp:  [96.3 °F (35.7 °C)-98.9 °F (37.2 °C)] 96.7 °F (35.9 °C)  Pulse:  [67-81] 70  Resp:  [14-18] 18  SpO2:  [94 %-98 %] 95 %  BP: (106-130)/(62-71) 118/71     Intake/Output - Last 3 Shifts       04/17 0700 - 04/18 0659 04/18 0700 - 04/19 0659 04/19 0700 - 04/20 0659    P.O. 1240 600     I.V. (mL/kg) 1214.2 (13.4)      IV Piggyback       Total Intake(mL/kg) 2454.2 (27.1) 600 (6.6)     Urine (mL/kg/hr) 700 (0.3) 0 (0)     Stool 0 (0) 0 (0)     Blood       Total Output 700 0      Net +1754.2 +600             Urine Occurrence 8 x 9 x     Stool Occurrence 0 x 0 x 0 x          Physical Exam   Constitutional: He is oriented to person, place, and time. He appears well-developed and well-nourished. No distress.   Cardiovascular: Normal rate, regular rhythm and normal heart sounds.    Pulmonary/Chest: Effort normal and breath sounds normal. No respiratory distress. He has no wheezes. He has no rales.   Abdominal: Soft. He exhibits no distension and no mass. There is no tenderness. There is no guarding.   Inc line healing well   Musculoskeletal:  Normal range of motion.   Neurological: He is alert and oriented to person, place, and time.   Skin: Skin is warm and dry.   Psychiatric: He has a normal mood and affect. His behavior is normal. Judgment and thought content normal.   Nursing note and vitals reviewed.        Significant Labs:  BMP (Last 3 Results):   Recent Labs  Lab 04/17/18  0506 04/18/18  0450 04/19/18  0511    93 96    141 139   K 3.9 3.9 3.8    108 106   CO2 20* 23 27   BUN 10 10 9   CREATININE 0.8 0.9 1.0   CALCIUM 8.4* 8.3* 8.5*   MG 2.2 2.1 2.2     BMP:   Recent Labs  Lab 04/19/18  0511   GLU 96      K 3.8      CO2 27   BUN 9   CREATININE 1.0   CALCIUM 8.5*   MG 2.2     CBC (Last 3 Results):   Recent Labs  Lab 04/17/18  0506 04/18/18  0450 04/19/18  0511   WBC 9.25 5.96 6.79   RBC 3.61* 3.45* 3.49*   HGB 12.0* 11.4* 11.6*   HCT 33.4* 31.6* 32.6*   * 118* 140*   MCV 93 92 93   MCH 33.2* 33.0* 33.2*   MCHC 35.9 36.1* 35.6       Significant Diagnostics:  None

## 2018-04-19 NOTE — PROGRESS NOTES
[facility]  Colorectal Surgery  Progress Note    Patient Name: Bud Roberts Jr.  MRN: [id number]  Admission Date: 4/16/2018  Hospital Length of Stay: 2 days  Attending Physician: Martin Greenberg MD    Subjective:     Interval History: MANJULA overnight. Some gas in bag. Ambulating, pain controlled. Stoma teaching today.     Post-Op Info:  Procedure(s) (LRB):  RESECTION-COLON-LOW ANTERIOR, colostomy (N/A)   2 Days Post-Op      Medications:  Continuous Infusions:   lactated ringers 50 mL/hr at 04/17/18 0602     Scheduled Meds:   enoxaparin  40 mg Subcutaneous Daily    ibuprofen  400 mg Oral Q6H    mupirocin  1 g Nasal BID    pantoprazole  40 mg Oral Daily     PRN Meds:   calcium carbonate    diphenhydrAMINE    metoclopramide HCl    morphine    naloxone    ondansetron    oxyCODONE    ramelteon    sodium chloride 2%        Objective:     Vital Signs (Most Recent):  Temp: 98.9 °F (37.2 °C) (04/18/18 1654)  Pulse: 81 (04/18/18 1654)  Resp: 18 (04/18/18 1654)  BP: 118/66 (04/18/18 1654)  SpO2: 98 % (04/18/18 1654) Vital Signs (24h Range):  Temp:  [98.2 °F (36.8 °C)-98.9 °F (37.2 °C)] 98.9 °F (37.2 °C)  Pulse:  [65-81] 81  Resp:  [16-18] 18  SpO2:  [95 %-98 %] 98 %  BP: (102-123)/(58-66) 118/66     Intake/Output - Last 3 Shifts       04/16 0700 - 04/17 0659 04/17 0700 - 04/18 0659 04/18 0700 - 04/19 0659    P.O. 1800 1240     I.V. (mL/kg) 2915.8 (32.1) 1214.2 (13.4)     IV Piggyback 400      Total Intake(mL/kg) 5115.8 (56.4) 2454.2 (27.1)     Urine (mL/kg/hr) 4915 (2.3) 700 (0.3) 0 (0)    Stool 0 (0) 0 (0) 0 (0)    Blood 150 (0.1)      Total Output 5065 700 0    Net +50.8 +1754.2 0           Urine Occurrence  8 x 4 x    Stool Occurrence 0 x 0 x 0 x          Physical Exam   Constitutional: He is oriented to person, place, and time. He appears well-developed and well-nourished.   HENT:   Head: Normocephalic and atraumatic.   Eyes: EOM are normal.   Cardiovascular: Normal rate.    Pulmonary/Chest:  Effort normal. No respiratory distress.   Abdominal: Soft. He exhibits no distension and no mass. There is tenderness (appropriate postop). There is no rebound and no guarding. No hernia.   Stoma pink with gas in bag  Incision with dermabond in place   Musculoskeletal: Normal range of motion.   Neurological: He is alert and oriented to person, place, and time.   Skin: Skin is warm. Capillary refill takes less than 2 seconds.   Psychiatric: He has a normal mood and affect. His behavior is normal.   Nursing note and vitals reviewed.      Significant Labs:  BMP (Last 3 Results):     Recent Labs  Lab 04/17/18  0506 04/18/18  0450    93    141   K 3.9 3.9    108   CO2 20* 23   BUN 10 10   CREATININE 0.8 0.9   CALCIUM 8.4* 8.3*   MG 2.2 2.1     CBC (Last 3 Results):     Recent Labs  Lab 04/17/18  0506 04/18/18  0450   WBC 9.25 5.96   RBC 3.61* 3.45*   HGB 12.0* 11.4*   HCT 33.4* 31.6*   * 118*   MCV 93 92   MCH 33.2* 33.0*   MCHC 35.9 36.1*       Significant Diagnostics:  None    Assessment/Plan:     Incontinence of feces    Now 2 Days Post-Op s/p resection of coloanal anastomosis and end colostomy    - Continue low residue diet  - D/C IVF  - Steve out  - Ambulate TID  - Stoma teaching  - Lovenox ppx    Dispo: likely home tomorrow pending stoma teaching and bowel function              Alicia Peña MD  Colorectal Surgery  [facility]

## 2018-04-19 NOTE — PROGRESS NOTES
[facility]  Colorectal Surgery  Progress Note    Patient Name: Bud Roberts Jr.  MRN: [id number]  Admission Date: 4/16/2018  Hospital Length of Stay: 3 days  Attending Physician: Martin Greenberg MD    Subjective:     Interval History: no acute events overnite, flatus only from ostomy, eating a little better    Post-Op Info:  Procedure(s) (LRB):  RESECTION-COLON-LOW ANTERIOR, colostomy (N/A)   3 Days Post-Op      Medications:  Continuous Infusions:   lactated ringers 50 mL/hr at 04/17/18 0602     Scheduled Meds:   enoxaparin  40 mg Subcutaneous Daily    ibuprofen  400 mg Oral Q6H    mupirocin  1 g Nasal BID    pantoprazole  40 mg Oral Daily    polyethylene glycol  17 g Oral Daily     PRN Meds:   calcium carbonate    diphenhydrAMINE    metoclopramide HCl    morphine    naloxone    ondansetron    oxyCODONE    ramelteon    sodium chloride 2%        Objective:     Vital Signs (Most Recent):  Temp: 96.7 °F (35.9 °C) (04/19/18 1113)  Pulse: 70 (04/19/18 1113)  Resp: 18 (04/19/18 1113)  BP: 118/71 (04/19/18 1113)  SpO2: 95 % (04/19/18 1113) Vital Signs (24h Range):  Temp:  [96.3 °F (35.7 °C)-98.9 °F (37.2 °C)] 96.7 °F (35.9 °C)  Pulse:  [67-81] 70  Resp:  [14-18] 18  SpO2:  [94 %-98 %] 95 %  BP: (106-130)/(62-71) 118/71     Intake/Output - Last 3 Shifts       04/17 0700 - 04/18 0659 04/18 0700 - 04/19 0659 04/19 0700 - 04/20 0659    P.O. 1240 600     I.V. (mL/kg) 1214.2 (13.4)      IV Piggyback       Total Intake(mL/kg) 2454.2 (27.1) 600 (6.6)     Urine (mL/kg/hr) 700 (0.3) 0 (0)     Stool 0 (0) 0 (0)     Blood       Total Output 700 0      Net +1754.2 +600             Urine Occurrence 8 x 9 x     Stool Occurrence 0 x 0 x 0 x          Physical Exam   Constitutional: He is oriented to person, place, and time. He appears well-developed and well-nourished. No distress.   Cardiovascular: Normal rate, regular rhythm and normal heart sounds.    Pulmonary/Chest: Effort normal and breath sounds normal.  No respiratory distress. He has no wheezes. He has no rales.   Abdominal: Soft. He exhibits no distension and no mass. There is no tenderness. There is no guarding.   Inc line healing well   Musculoskeletal: Normal range of motion.   Neurological: He is alert and oriented to person, place, and time.   Skin: Skin is warm and dry.   Psychiatric: He has a normal mood and affect. His behavior is normal. Judgment and thought content normal.   Nursing note and vitals reviewed.        Significant Labs:  BMP (Last 3 Results):   Recent Labs  Lab 04/17/18  0506 04/18/18  0450 04/19/18  0511    93 96    141 139   K 3.9 3.9 3.8    108 106   CO2 20* 23 27   BUN 10 10 9   CREATININE 0.8 0.9 1.0   CALCIUM 8.4* 8.3* 8.5*   MG 2.2 2.1 2.2     BMP:   Recent Labs  Lab 04/19/18  0511   GLU 96      K 3.8      CO2 27   BUN 9   CREATININE 1.0   CALCIUM 8.5*   MG 2.2     CBC (Last 3 Results):   Recent Labs  Lab 04/17/18  0506 04/18/18  0450 04/19/18  0511   WBC 9.25 5.96 6.79   RBC 3.61* 3.45* 3.49*   HGB 12.0* 11.4* 11.6*   HCT 33.4* 31.6* 32.6*   * 118* 140*   MCV 93 92 93   MCH 33.2* 33.0* 33.2*   MCHC 35.9 36.1* 35.6       Significant Diagnostics:  None    Assessment/Plan:     * Incontinence of feces    Now 3 Days Post-Op s/p resection of coloanal anastomosis and end colostomy    - Continue low residue diet  - D/C IVF  - Steve out  - Ambulate TID  - Stoma teaching  - Lovenox ppx  -will digitize ostomy today for poss stimulation and begin miralax  -enc diet and amb    Dispo: likely home tomorrow pending stoma teaching and bowel function              Christine Ch NP  Colorectal Surgery  [facility]

## 2018-04-19 NOTE — SUBJECTIVE & OBJECTIVE
Subjective:     Interval History: MANJULA overnight. Some gas in bag. Ambulating, pain controlled. Stoma teaching today.     Post-Op Info:  Procedure(s) (LRB):  RESECTION-COLON-LOW ANTERIOR, colostomy (N/A)   2 Days Post-Op      Medications:  Continuous Infusions:   lactated ringers 50 mL/hr at 04/17/18 0602     Scheduled Meds:   enoxaparin  40 mg Subcutaneous Daily    ibuprofen  400 mg Oral Q6H    mupirocin  1 g Nasal BID    pantoprazole  40 mg Oral Daily     PRN Meds:   calcium carbonate    diphenhydrAMINE    metoclopramide HCl    morphine    naloxone    ondansetron    oxyCODONE    ramelteon    sodium chloride 2%        Objective:     Vital Signs (Most Recent):  Temp: 98.9 °F (37.2 °C) (04/18/18 1654)  Pulse: 81 (04/18/18 1654)  Resp: 18 (04/18/18 1654)  BP: 118/66 (04/18/18 1654)  SpO2: 98 % (04/18/18 1654) Vital Signs (24h Range):  Temp:  [98.2 °F (36.8 °C)-98.9 °F (37.2 °C)] 98.9 °F (37.2 °C)  Pulse:  [65-81] 81  Resp:  [16-18] 18  SpO2:  [95 %-98 %] 98 %  BP: (102-123)/(58-66) 118/66     Intake/Output - Last 3 Shifts       04/16 0700 - 04/17 0659 04/17 0700 - 04/18 0659 04/18 0700 - 04/19 0659    P.O. 1800 1240     I.V. (mL/kg) 2915.8 (32.1) 1214.2 (13.4)     IV Piggyback 400      Total Intake(mL/kg) 5115.8 (56.4) 2454.2 (27.1)     Urine (mL/kg/hr) 4915 (2.3) 700 (0.3) 0 (0)    Stool 0 (0) 0 (0) 0 (0)    Blood 150 (0.1)      Total Output 5065 700 0    Net +50.8 +1754.2 0           Urine Occurrence  8 x 4 x    Stool Occurrence 0 x 0 x 0 x          Physical Exam   Constitutional: He is oriented to person, place, and time. He appears well-developed and well-nourished.   HENT:   Head: Normocephalic and atraumatic.   Eyes: EOM are normal.   Cardiovascular: Normal rate.    Pulmonary/Chest: Effort normal. No respiratory distress.   Abdominal: Soft. He exhibits no distension and no mass. There is tenderness (appropriate postop). There is no rebound and no guarding. No hernia.   Stoma pink with gas in  bag  Incision with dermabond in place   Musculoskeletal: Normal range of motion.   Neurological: He is alert and oriented to person, place, and time.   Skin: Skin is warm. Capillary refill takes less than 2 seconds.   Psychiatric: He has a normal mood and affect. His behavior is normal.   Nursing note and vitals reviewed.      Significant Labs:  BMP (Last 3 Results):     Recent Labs  Lab 04/17/18  0506 04/18/18  0450    93    141   K 3.9 3.9    108   CO2 20* 23   BUN 10 10   CREATININE 0.8 0.9   CALCIUM 8.4* 8.3*   MG 2.2 2.1     CBC (Last 3 Results):     Recent Labs  Lab 04/17/18  0506 04/18/18  0450   WBC 9.25 5.96   RBC 3.61* 3.45*   HGB 12.0* 11.4*   HCT 33.4* 31.6*   * 118*   MCV 93 92   MCH 33.2* 33.0*   MCHC 35.9 36.1*       Significant Diagnostics:  None

## 2018-04-19 NOTE — ASSESSMENT & PLAN NOTE
Now 2 Days Post-Op s/p resection of coloanal anastomosis and end colostomy    - Continue low residue diet  - D/C IVF  - Steve out  - Ambulate TID  - Stoma teaching  - Lovenox ppx    Dispo: likely home tomorrow pending stoma teaching and bowel function

## 2018-04-19 NOTE — PROGRESS NOTES
Patient seen for follow up colostomy education. Reviewed previous day education with patient and wife, no questions noted. Stoma red, well budded, 38mm, peristomal skin intact. 150cc of liquid brown output noted in previous pouch. Digitalized stoma per Christine Ch NP with CRS request, no strictures noted during assessment, patient tolerated well. Patient's wife cut pouch, assisted in applying coloplast paula pouch [id number]. Answered questions after lesson. Extra supplies ordered to patient's room. coloplast contacted for two piece opaque pouches paula. Educational material at bedside. Scant drainage noted to midline incision, aquacel ag rope and comfeel hydrocolloid cut to fit over incision. Recommend changing twice a week with pouch changes. Supplies left at bedside. Ostomy dept to continue to follow.

## 2018-04-19 NOTE — ASSESSMENT & PLAN NOTE
Now 3 Days Post-Op s/p resection of coloanal anastomosis and end colostomy    - Continue low residue diet  - D/C IVF  - Steve out  - Ambulate TID  - Stoma teaching  - Lovenox ppx  -will digitize ostomy today for poss stimulation and begin miralax  -enc diet and amb    Dispo: likely home tomorrow pending stoma teaching and bowel function

## 2018-04-19 NOTE — PLAN OF CARE
Problem: Patient Care Overview  Goal: Plan of Care Review  Outcome: Ongoing (interventions implemented as appropriate)  Plan of care reviewed with pt. Pt AAOx's4, vital signs stable. Few complaints of pain relieved with prn meds. Midline intact. LLQ colostomy intact. Pt tolerating a regular diet well, no nausea or vomiting noted. Pt ambulates multiple times throughout the day and remains free from falls or injuries. Currently on room air. Bed in low and locked position with call light position. No acute events at this time. WCTM

## 2018-04-20 ENCOUNTER — TELEPHONE (OUTPATIENT)
Dept: SURGERY | Facility: CLINIC | Age: 48
End: 2018-04-20

## 2018-04-20 VITALS
RESPIRATION RATE: 18 BRPM | DIASTOLIC BLOOD PRESSURE: 70 MMHG | TEMPERATURE: 98 F | SYSTOLIC BLOOD PRESSURE: 122 MMHG | BODY MASS INDEX: 31.39 KG/M2 | HEIGHT: 67 IN | OXYGEN SATURATION: 97 % | WEIGHT: 200 LBS | HEART RATE: 77 BPM

## 2018-04-20 LAB
ANION GAP SERPL CALC-SCNC: 8 MMOL/L
BASOPHILS # BLD AUTO: 0.01 K/UL
BASOPHILS NFR BLD: 0.2 %
BUN SERPL-MCNC: 10 MG/DL
CALCIUM SERPL-MCNC: 8.5 MG/DL
CHLORIDE SERPL-SCNC: 105 MMOL/L
CO2 SERPL-SCNC: 25 MMOL/L
CREAT SERPL-MCNC: 0.9 MG/DL
DIFFERENTIAL METHOD: ABNORMAL
EOSINOPHIL # BLD AUTO: 0.2 K/UL
EOSINOPHIL NFR BLD: 3.1 %
ERYTHROCYTE [DISTWIDTH] IN BLOOD BY AUTOMATED COUNT: 11.9 %
EST. GFR  (AFRICAN AMERICAN): >60 ML/MIN/1.73 M^2
EST. GFR  (NON AFRICAN AMERICAN): >60 ML/MIN/1.73 M^2
GLUCOSE SERPL-MCNC: 91 MG/DL
HCT VFR BLD AUTO: 30.6 %
HGB BLD-MCNC: 11.2 G/DL
IMM GRANULOCYTES # BLD AUTO: 0.03 K/UL
IMM GRANULOCYTES NFR BLD AUTO: 0.5 %
LYMPHOCYTES # BLD AUTO: 1.3 K/UL
LYMPHOCYTES NFR BLD: 22.8 %
MAGNESIUM SERPL-MCNC: 2 MG/DL
MCH RBC QN AUTO: 33.2 PG
MCHC RBC AUTO-ENTMCNC: 36.6 G/DL
MCV RBC AUTO: 91 FL
MONOCYTES # BLD AUTO: 0.6 K/UL
MONOCYTES NFR BLD: 11 %
NEUTROPHILS # BLD AUTO: 3.5 K/UL
NEUTROPHILS NFR BLD: 62.4 %
NRBC BLD-RTO: 0 /100 WBC
PHOSPHATE SERPL-MCNC: 3.7 MG/DL
PLATELET # BLD AUTO: 137 K/UL
PMV BLD AUTO: 9.6 FL
POTASSIUM SERPL-SCNC: 3.7 MMOL/L
RBC # BLD AUTO: 3.37 M/UL
SODIUM SERPL-SCNC: 138 MMOL/L
WBC # BLD AUTO: 5.53 K/UL

## 2018-04-20 PROCEDURE — 85025 COMPLETE CBC W/AUTO DIFF WBC: CPT

## 2018-04-20 PROCEDURE — 25000003 PHARM REV CODE 250: Performed by: STUDENT IN AN ORGANIZED HEALTH CARE EDUCATION/TRAINING PROGRAM

## 2018-04-20 PROCEDURE — 25000003 PHARM REV CODE 250: Performed by: COLON & RECTAL SURGERY

## 2018-04-20 PROCEDURE — 80048 BASIC METABOLIC PNL TOTAL CA: CPT

## 2018-04-20 PROCEDURE — 36415 COLL VENOUS BLD VENIPUNCTURE: CPT

## 2018-04-20 PROCEDURE — 84100 ASSAY OF PHOSPHORUS: CPT

## 2018-04-20 PROCEDURE — 83735 ASSAY OF MAGNESIUM: CPT

## 2018-04-20 RX ORDER — OXYCODONE HYDROCHLORIDE 10 MG/1
10 TABLET ORAL EVERY 4 HOURS PRN
Qty: 40 TABLET | Refills: 0 | Status: SHIPPED | OUTPATIENT
Start: 2018-04-20

## 2018-04-20 RX ORDER — POLYETHYLENE GLYCOL 3350 17 G/17G
17 POWDER, FOR SOLUTION ORAL DAILY
Refills: 0 | COMMUNITY
Start: 2018-04-20

## 2018-04-20 RX ADMIN — OXYCODONE HYDROCHLORIDE 10 MG: 5 TABLET ORAL at 09:04

## 2018-04-20 RX ADMIN — IBUPROFEN 400 MG: 400 TABLET, FILM COATED ORAL at 05:04

## 2018-04-20 NOTE — PLAN OF CARE
CM discussed with patient today's expected discharge to home. Patient in agreement with plan. CM informed patient/ wife to expect a call from CRS clinic for 2 week follow up appt. with Dr. Greenberg and Vani, CNS,WOCN. Patient/ wife verbalized understanding. Patient's wife will provide transportation home.     04/20/18 0878   Final Note   Assessment Type Final Discharge Note   Discharge Disposition Home   Hospital Follow Up  Appt(s) scheduled? Yes   Discharge plans and expectations educations in teach back method with documentation complete? Yes

## 2018-04-20 NOTE — PROGRESS NOTES
Discharge instructions reviewed and signed with pt.  Prescriptions reviewed and given to pt.  Pt verbalized understanding.  All questions answered.  PIV d'c'd with cath tip intact and discarded.  Pt currently awaiting transport.

## 2018-04-20 NOTE — PLAN OF CARE
CM placed call to CRS clinic to schedule pt's 2 week f/u appt. with Dr. Greenberg and Vani, CNS, WOCN. CRS  reports nurse will call patient with date and time. CM  informed patient of plan for 2 week follow up appointments. Patient verbalized understanding.

## 2018-04-20 NOTE — DISCHARGE INSTRUCTIONS
Instructions after your bowel surgery:    No lifting > 10 lbs for 4 weeks    Regular diet as tolerated    No driving for 2 weeks or while on narcotics    Take miralax as needed for decreased colostomy output    Take tylenol and ibuprofen scheduled. Take the narcotics only as needed.     Call for any redness, swelling, ostomy issues, fevers or chills.

## 2018-04-20 NOTE — TELEPHONE ENCOUNTER
----- Message from Vicki Vo sent at 4/20/2018  3:43 PM CDT -----  Contact: Self [phone]  Pt is requesting a call back to discuss the dates of his short term disability and how long he will be out of work.    Pt may be reached at [phone].    Thank you.  LC  
Spoke with patient about his appointments on 5/10.  He would like to keep them and will talk to Dr. Greenberg about his return to work date.  
13.0   13.0  )-----------( 397      ( 15 Alonso 2018 16:33 )             40.6     15 Alonso 2018 16:33    138    |  101    |  10     ----------------------------<  120    3.9     |  25     |  1.01     Ca    9.3        15 Alonso 2018 16:33  Mg     1.7       15 Alonso 2018 17:05    TPro  7.7    /  Alb  3.5    /  TBili  0.3    /  DBili  x      /  AST  36     /  ALT  36     /  AlkPhos  72     15 Alonso 2018 16:33    PT/INR - ( 15 Alonso 2018 16:33 )   PT: 11.9 sec;   INR: 1.09 ratio      Creatine Kinase, Serum: 205 U/L (01-15 @ 17:05)    Troponin I, Serum: .023 ng/mL (01-15 @ 17:05)    Influenza Virus A and B Rapid Antigen (01.15.18 @ 17:09)    Influenza A Rapid Screen: Positive: Interpretation of Influenza virus Type A - Antigen Enzyme Immunoassay:

## 2018-04-20 NOTE — HPI
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     He presented for resection of his coloanal anastomosis and end colostomy creation which he underwent on 4/16/2018. He tolerated the procedure well.

## 2018-04-20 NOTE — HOSPITAL COURSE
He was admitted to the [facility] postoperatively. His diet was advanced. His marcus was removed. He was ambulating. He had stoma teaching by the wound ostomy nurses. He had stoma function. He was discharged on POD#4.

## 2018-04-20 NOTE — DISCHARGE SUMMARY
[facility]  Colorectal Surgery  Discharge Summary      Patient Name: Bud Roberts Jr.  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     He presented for resection of his coloanal anastomosis and end colostomy creation which he underwent on 4/16/2018. He tolerated the procedure well.     Procedure(s) (LRB):  RESECTION-COLON-LOW ANTERIOR, colostomy (N/A)     Hospital Course:  He was admitted to the [facility] postoperatively. His diet was advanced. His marcus was removed. He was ambulating. He had stoma teaching by the wound ostomy nurses. He had stoma function. He was discharged on POD#4.         Significant Diagnostic Studies: Labs:   BMP:   Recent Labs  Lab 04/19/18  0511 04/20/18  0417   GLU 96 91    138   K 3.8 3.7    105   CO2 27 25   BUN 9 10   CREATININE 1.0 0.9   CALCIUM 8.5* 8.5*   MG 2.2 2.0    and CBC   Recent Labs  Lab 04/19/18  0511 04/20/18  0417   WBC 6.79 5.53   HGB 11.6* 11.2*   HCT 32.6* 30.6*   * 137*       Pending Diagnostic Studies:     None        Final Active Diagnoses:    Diagnosis Date Noted POA    PRINCIPAL PROBLEM:  Incontinence of feces [R15.9] 03/15/2018 Yes      Problems Resolved During this Admission:    Diagnosis Date Noted Date Resolved POA      Discharged Condition: good    Disposition: Home or Self Care    Follow Up:  Follow-up Information     Martin Greenberg MD. Schedule an appointment as soon as possible for a visit in 2 weeks.    Specialty:  Colon and Rectal Surgery  Contact information:  [address]  [facility] [id number]  [phone]                  Patient Instructions:     Diet Adult Regular     Activity as tolerated       Medications:  Reconciled Home Medications:      Medication List      START taking these medications    oxyCODONE 10 mg Tab immediate release tablet  Commonly known as:  ROXICODONE  Take 1 tablet (10 mg total) by mouth every 4 (four) hours as needed.     polyethylene glycol 17 gram Pwpk  Commonly known as:  GLYCOLAX  Take 17 g by mouth once daily. Take while taking narcotics to avoid constipation. Stop if you are having good colostomy output        CONTINUE taking these medications    PRINIVIL 10 MG tablet  Generic drug:  lisinopril  Take 10 mg by mouth once daily. takes 1/2 tab= 5 mg     RABEprazole 20 mg tablet  Commonly known as:  ACIPHEX  Take 20 mg by mouth 2 (two) times daily. takes 2 tabs at night     RU-HIST FORTE ORAL  Take by mouth 2 (two) times daily.        STOP taking these medications    hydrocodone-acetaminophen 10-325mg  mg Tab  Commonly known as:  NORCO     metroNIDAZOLE 500 MG tablet  Commonly known as:  FLAGYL            Alicia Peña MD  Colorectal Surgery  [facility]

## 2018-04-20 NOTE — TELEPHONE ENCOUNTER
----- Message from Laquita Zamorano MA sent at 4/20/2018  9:18 AM CDT -----  Contact: [phone]  Pt needs a 2 week post op appt with DR yoder and Vani as well.  No time available with Dr Yoder  Till May 24th.     [phone]

## 2018-04-23 NOTE — PHYSICIAN QUERY
PT Name: Bud Roberts Jr.  MR #: [id number]     Physician Query Form - Documentation Clarification      CDS/: Amber Bernardo RN, CCDS               Contact information:  [email or web address]          This form is a permanent document in the medical record.     Query Date: April 23, 2018    By submitting this query, we are merely seeking further clarification of documentation. Please utilize your independent clinical judgment when addressing the question(s) below.    The Medical record reflects the following:    Supporting Clinical Findings Location in Medical Record   Date of procedure:  04/16/2018  Procedures performed:  Low anterior resection and creation of end colostomy with excision of previous coloanal anastomosis.     Preoperative diagnoses:  1.  History of rectal cancer, status post previous low anterior resection with handsewn coloanal anastomosis.  2.  Fecal incontinence.  3.  Low anterior resection syndrome.  Postoperative diagnoses:  1.  History of rectal cancer, status post previous low anterior resection with handsewn coloanal anastomosis.  2.  Fecal incontinence.  3.  Low anterior resection syndrome    Specimens:  Previous coloanal anastomosis and part of descending colon.    We then examined the descending colon, which appeared healthy all the way down to the anastomosis.    The colon was examined and there did appear to be some compromise of the blood supply at the level of the previous anastomosis, so we decided to take this back after making our colostomy incision.   We did resect approximately 10 cm of the distal descending colon to the level where it had good blood supply.    The colon appeared healthy and the specimen was then sent off.   Op note        Op note                      Op note    Op note                                                                                                Doctor, Please specify diagnosis or diagnoses associated with above clinical findings.  Please further  "specify "the colon was examined and there did appear to be some compromise of the blood supply at the level of the previous anastomosis,"    Provider Use Only      [   ] Inherent/integral to procedure  [   ] Routine outcome  [xx   ] Incidental finding  [   ] Inadvertent occurrence  [   ] Not a complication of procedure  [   ] Complication of procedure  [   ] Other (specify) _________________                                                                                                                   [  ] Clinically undetermined            "

## 2018-05-10 ENCOUNTER — OFFICE VISIT (OUTPATIENT)
Dept: SURGERY | Facility: CLINIC | Age: 48
End: 2018-05-10
Payer: OTHER GOVERNMENT

## 2018-05-10 ENCOUNTER — OFFICE VISIT (OUTPATIENT)
Dept: WOUND CARE | Facility: CLINIC | Age: 48
End: 2018-05-10
Payer: OTHER GOVERNMENT

## 2018-05-10 VITALS
BODY MASS INDEX: 29.1 KG/M2 | DIASTOLIC BLOOD PRESSURE: 83 MMHG | HEART RATE: 103 BPM | SYSTOLIC BLOOD PRESSURE: 132 MMHG | WEIGHT: 192 LBS | HEIGHT: 68 IN

## 2018-05-10 DIAGNOSIS — Z85.048 HISTORY OF RECTAL OR ANAL CANCER: Primary | ICD-10-CM

## 2018-05-10 DIAGNOSIS — Z43.3 ATTENTION TO COLOSTOMY: Primary | ICD-10-CM

## 2018-05-10 DIAGNOSIS — Z93.3 COLOSTOMY IN PLACE: ICD-10-CM

## 2018-05-10 DIAGNOSIS — Z71.89 ENCOUNTER FOR OSTOMY CARE EDUCATION: ICD-10-CM

## 2018-05-10 PROCEDURE — 99999 PR PBB SHADOW E&M-EST. PATIENT-LVL I: CPT | Mod: PBBFAC,,, | Performed by: CLINICAL NURSE SPECIALIST

## 2018-05-10 PROCEDURE — 99999 PR PBB SHADOW E&M-EST. PATIENT-LVL III: CPT | Mod: PBBFAC,,, | Performed by: COLON & RECTAL SURGERY

## 2018-05-10 PROCEDURE — 99024 POSTOP FOLLOW-UP VISIT: CPT | Mod: ,,, | Performed by: COLON & RECTAL SURGERY

## 2018-05-10 PROCEDURE — 99211 OFF/OP EST MAY X REQ PHY/QHP: CPT | Mod: PBBFAC,27 | Performed by: CLINICAL NURSE SPECIALIST

## 2018-05-10 PROCEDURE — 99024 POSTOP FOLLOW-UP VISIT: CPT | Mod: ,,, | Performed by: CLINICAL NURSE SPECIALIST

## 2018-05-10 PROCEDURE — 99213 OFFICE O/P EST LOW 20 MIN: CPT | Mod: PBBFAC | Performed by: COLON & RECTAL SURGERY

## 2018-05-10 NOTE — PROGRESS NOTES
This patient is known to me and is here in clinic today for first post op evaluation related to colostomy. Surgery done 4/16 by Dr. Greenberg. The patient reports some skin problems with  new ostomy. The patient is not receiving home health care .   Pain level today is reported as  0.    The colostomy is 30mm jerardo medium budded stoma.  The patient is currently  wearing a 1piece pouching system by [employer]. But interested in 2 piece  Average wear time is 3 days.   Peristomal skin is folliculitis    There is no  mucocutaneous separation.  Pt is coping well with the new ostomy.  SUPPLIES/DME: will send to [facility]  Patient enrolled in [employer] [facility] Program. Pt gives verbal permission and understanding a representative will call them regarding samples sent and follow up needs.  Samples requested today based on needs or patient requests today.    Pouching concerns include:  None, has nice budded stoma, tried a ac coupling [employer] today   Gave [place] and other info  SPECIAL NEEDS:  Pt counseled on skin care, nutrition, hydration as well as  how to order ostomy supplies.  I spent greater than 50% of this 35 minute visit in face to face counseling.

## 2018-05-10 NOTE — PROGRESS NOTES
Patient ID:  Bud Roberts Jr. is a 48 y.o. male     Chief Complaint:   Chief Complaint   Patient presents with    Post-op Problem        HPI: 4/16/18 protectomy and end colostomy. Doing well happy with provedure. Minimal drainage from anus.      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 C-scope Sept 2016 neg scans    Has urgency and accidents.    ROS:        Constitutional: No fever, chills, activity or appetite change.      HENT: No hearing loss, facial swelling, neck pain or stiffness.       Eyes: No discharge, itching and visual disturbance.      Respiratory: No apnea, cough, choking or shortness of breath.       Cardiovascular: No leg swelling or chest pain      Gastrointestinal: No abdominal distention or change in bowel habbits     Genitourinary: No dysuria, frequency or flank pain.      Musculoskeletal: No arthralgias or gait problem.      Neurological: No dizziness, seizures or weakness.      Hematological: No adenopathy.      Psychiatric/Behavioral: No hallucinations or behavioral problems.       PE:    APPEARANCE: Well nourished, well developed, in no acute distress.   CHEST: Lungs clear. Normal respiratory effort.  CARDIOVASCULAR: Normal rhythm. No edema.  ABDOMEN: Soft. No tenderness or masses.healed incision. Colostomy in LLQ. Seen with [facility].  Rectum:  Normal skin, no drainage  Musculoskeletal: Symmetric, normal range of motion and strength.   Neurological: Alert and oriented to person, place, and time. Normal reflexes.   Skin: Skin is warm and dry.   Psychiatric: Normal mood and affect. Behavior is normal and appropriate.     Impression: s/p protetomy, colosytomy  PLAN:Return to work. RTC 1 year.

## 2018-06-04 ENCOUNTER — PATIENT MESSAGE (OUTPATIENT)
Dept: ADMINISTRATIVE | Facility: OTHER | Age: 48
End: 2018-06-04

## 2018-09-18 ENCOUNTER — TELEPHONE (OUTPATIENT)
Dept: SURGERY | Facility: CLINIC | Age: 48
End: 2018-09-18

## 2018-09-18 NOTE — TELEPHONE ENCOUNTER
----- Message from Drea Craft sent at 9/18/2018  8:28 AM CDT -----  Contact: self [phone]  Pt called stating his /[employer] need to have a letter stating he doesn't have any type of restrictions or disability to do any type of activities. He states it's been going on a while. He states they told him he needs it by the end of the week.     Contact: Attn: Bud Roberts fax [phone]                Contact: self [phone]

## 2018-09-20 ENCOUNTER — TELEPHONE (OUTPATIENT)
Dept: SURGERY | Facility: CLINIC | Age: 48
End: 2018-09-20

## 2018-09-20 NOTE — TELEPHONE ENCOUNTER
----- Message from Rachael Garcia sent at 9/20/2018  9:41 AM CDT -----  Contact: self [phone]  Needs Advice    Reason for call:Pt is calling for a update on letter for the  stating that he do not have any restrictions         Communication Preference:callback     Additional Information:

## 2018-09-20 NOTE — TELEPHONE ENCOUNTER
----- Message from Laquita Zamorano MA sent at 9/20/2018 10:19 AM CDT -----  Contact: [phone]   Patient Returning Call from [facility]    Who Left Message for Patient: Eulalia    Communication Preference: [phone]    Additional Information: Re: a letter that Dr Greenberg is writing for him

## 2018-09-20 NOTE — TELEPHONE ENCOUNTER
Spoke to pt. Faxed and emailed letter per his request. (fax [phone] and email [email or web address])

## (undated) DEVICE — LUBRICANT SURGILUBE 2 OZ

## (undated) DEVICE — POWDER ARISTA AH 1GM

## (undated) DEVICE — SEE MEDLINE ITEM 146373

## (undated) DEVICE — SUT CTD VICRYL VIL BR CT-2

## (undated) DEVICE — ADHESIVE DERMABOND ADVANCED

## (undated) DEVICE — SYR 50ML CATH TIP

## (undated) DEVICE — SUT 0 54IN CHROMIC GUT

## (undated) DEVICE — DRESSING ADH ISLAND 3.6 X 14

## (undated) DEVICE — TRAY FOLEY 16FR INFECTION CONT

## (undated) DEVICE — SUT 2/0 30IN SILK BLK BRAI

## (undated) DEVICE — TAPE UMBILICAL 1/8X36IN WHITE

## (undated) DEVICE — CUTTER PROXIMATE BLUE 75MM

## (undated) DEVICE — LEGGINGS 48X31 INCH

## (undated) DEVICE — SUT PDS II 1 TP-1 VIL

## (undated) DEVICE — SYR ONLY LUER LOCK 20CC

## (undated) DEVICE — SEE MEDLINE ITEM 157144

## (undated) DEVICE — SUT CTD VICRYL VIL BR SH 27

## (undated) DEVICE — DRAPE ABDOMINAL TIBURON 14X11

## (undated) DEVICE — SEE MEDLINE ITEM 146347

## (undated) DEVICE — DRAPE STERI 7IN

## (undated) DEVICE — SEE MEDLINE ITEM 152487

## (undated) DEVICE — KIT EVACUATOR FLAT DRAIN 100CC

## (undated) DEVICE — EVACUATOR WOUND BULB 100CC

## (undated) DEVICE — COVER LIGHT HANDLE 80/CA

## (undated) DEVICE — NDL BOX COUNTER

## (undated) DEVICE — ELECTRODE REM PLYHSV RETURN 9

## (undated) DEVICE — NDL 22GA X1 1/2 REG BEVEL

## (undated) DEVICE — SUT 0 27IN CHROMIC GUT CT-1

## (undated) DEVICE — CATH PEZZAR 34 FR

## (undated) DEVICE — SUT VICRYL PLUS 3-0 FS1 27

## (undated) DEVICE — SEE MEDLINE ITEM 146417

## (undated) DEVICE — SEE L#95700

## (undated) DEVICE — SUT 4/0 36IN ETHIBOND EXCE

## (undated) DEVICE — SEE MEDLINE ITEM 157181

## (undated) DEVICE — SEE MEDLINE ITEM 156902

## (undated) DEVICE — ELECTRODE EXTENDED BLADE